# Patient Record
Sex: MALE | Race: WHITE
[De-identification: names, ages, dates, MRNs, and addresses within clinical notes are randomized per-mention and may not be internally consistent; named-entity substitution may affect disease eponyms.]

---

## 2017-10-20 ENCOUNTER — HOSPITAL ENCOUNTER (INPATIENT)
Dept: HOSPITAL 74 - FER | Age: 62
LOS: 4 days | Discharge: HOME | DRG: 378 | End: 2017-10-24
Attending: NURSE PRACTITIONER | Admitting: INTERNAL MEDICINE
Payer: COMMERCIAL

## 2017-10-20 VITALS — BODY MASS INDEX: 28.7 KG/M2

## 2017-10-20 DIAGNOSIS — I10: ICD-10-CM

## 2017-10-20 DIAGNOSIS — E78.5: ICD-10-CM

## 2017-10-20 DIAGNOSIS — I25.2: ICD-10-CM

## 2017-10-20 DIAGNOSIS — K21.9: ICD-10-CM

## 2017-10-20 DIAGNOSIS — Z79.1: ICD-10-CM

## 2017-10-20 DIAGNOSIS — Z87.11: ICD-10-CM

## 2017-10-20 DIAGNOSIS — I25.110: ICD-10-CM

## 2017-10-20 DIAGNOSIS — K20.9: ICD-10-CM

## 2017-10-20 DIAGNOSIS — Z87.891: ICD-10-CM

## 2017-10-20 DIAGNOSIS — E83.42: ICD-10-CM

## 2017-10-20 DIAGNOSIS — Z95.5: ICD-10-CM

## 2017-10-20 DIAGNOSIS — D62: ICD-10-CM

## 2017-10-20 DIAGNOSIS — K26.4: Primary | ICD-10-CM

## 2017-10-20 LAB
ALBUMIN SERPL-MCNC: 3.7 G/DL (ref 3.5–5)
ALP SERPL-CCNC: 47 U/L (ref 32–92)
ALT SERPL-CCNC: 31 U/L (ref 10–40)
ANION GAP SERPL CALC-SCNC: 6 MMOL/L (ref 8–16)
APTT BLD: 25 SECONDS (ref 24–38.9)
AST SERPL-CCNC: 26 U/L (ref 10–42)
BASOPHILS # BLD: 0.1 % (ref 0–2)
BILIRUB SERPL-MCNC: 1 MG/DL (ref 0.2–1)
CALCIUM SERPL-MCNC: 10.4 MG/DL (ref 8.4–10.2)
CK SERPL-CCNC: 182 IU/L (ref 39–308)
CO2 SERPL-SCNC: 27 MMOL/L (ref 22–28)
COLOR UR: YELLOW
CREAT SERPL-MCNC: 0.8 MG/DL (ref 0.6–1.3)
DEPRECATED RDW RBC AUTO: 12.1 % (ref 11.9–15.9)
DEPRECATED RDW RBC AUTO: 12.2 % (ref 11.9–15.9)
DEPRECATED RDW RBC AUTO: 13.1 % (ref 11.9–15.9)
EOSINOPHIL # BLD: 3.7 % (ref 0–4.5)
GLUCOSE SERPL-MCNC: 117 MG/DL (ref 74–106)
HEMOCCULT SP1 STL QL IA: POSITIVE
INR BLD: 1.06 (ref 0.82–1.09)
MCH RBC QN AUTO: 33.3 PG (ref 25.7–33.7)
MCH RBC QN AUTO: 33.5 PG (ref 25.7–33.7)
MCH RBC QN AUTO: 33.6 PG (ref 25.7–33.7)
MCHC RBC AUTO-ENTMCNC: 34.2 G/DL (ref 32–35.9)
MCHC RBC AUTO-ENTMCNC: 34.4 G/DL (ref 32–35.9)
MCHC RBC AUTO-ENTMCNC: 34.6 G/DL (ref 32–35.9)
MCV RBC: 97.1 FL (ref 80–96)
MCV RBC: 97.2 FL (ref 80–96)
MCV RBC: 97.4 FL (ref 80–96)
NEUTROPHILS # BLD: 62.4 % (ref 42.8–82.8)
PH UR: 7 [PH] (ref 4.5–8)
PLATELET # BLD AUTO: 178 K/MM3 (ref 134–434)
PLATELET # BLD AUTO: 196 K/MM3 (ref 134–434)
PLATELET # BLD AUTO: 215 K/MM3 (ref 134–434)
PMV BLD: 7.1 FL (ref 7.5–11.1)
PMV BLD: 7.3 FL (ref 7.5–11.1)
PMV BLD: 7.8 FL (ref 7.5–11.1)
PROT SERPL-MCNC: 5.7 G/DL (ref 6.4–8.3)
PT PNL PPP: 11.8 SEC (ref 10.2–13)
SP GR UR: 1.02 (ref 1–1.02)
TROPONIN I SERPL-MCNC: < 0.03 NG/ML (ref 0.03–0.5)
UROBILINOGEN UR STRIP-MCNC: 0.2 MG/DL (ref 0.2–1)
WBC # BLD AUTO: 11.2 K/MM3 (ref 4–10)
WBC # BLD AUTO: 11.6 K/MM3 (ref 4–10.8)
WBC # BLD AUTO: 8.6 K/MM3 (ref 4–10.8)

## 2017-10-20 PROCEDURE — 0DD68ZX EXTRACTION OF STOMACH, VIA NATURAL OR ARTIFICIAL OPENING ENDOSCOPIC, DIAGNOSTIC: ICD-10-PCS | Performed by: INTERNAL MEDICINE

## 2017-10-20 PROCEDURE — P9058 RBC, L/R, CMV-NEG, IRRAD: HCPCS

## 2017-10-20 PROCEDURE — 0DD58ZX EXTRACTION OF ESOPHAGUS, VIA NATURAL OR ARTIFICIAL OPENING ENDOSCOPIC, DIAGNOSTIC: ICD-10-PCS | Performed by: INTERNAL MEDICINE

## 2017-10-20 PROCEDURE — 0W3P8ZZ CONTROL BLEEDING IN GASTROINTESTINAL TRACT, VIA NATURAL OR ARTIFICIAL OPENING ENDOSCOPIC: ICD-10-PCS | Performed by: INTERNAL MEDICINE

## 2017-10-20 PROCEDURE — 3E0G8GC INTRODUCTION OF OTHER THERAPEUTIC SUBSTANCE INTO UPPER GI, VIA NATURAL OR ARTIFICIAL OPENING ENDOSCOPIC: ICD-10-PCS | Performed by: INTERNAL MEDICINE

## 2017-10-20 PROCEDURE — P9038 RBC IRRADIATED: HCPCS

## 2017-10-20 RX ADMIN — PANTOPRAZOLE SODIUM SCH MG: 40 INJECTION, POWDER, FOR SOLUTION INTRAVENOUS at 21:50

## 2017-10-20 RX ADMIN — CHLORHEXIDINE GLUCONATE SCH APPLIC: 213 SOLUTION TOPICAL at 21:50

## 2017-10-20 RX ADMIN — SODIUM CHLORIDE SCH MLS/HR: 9 INJECTION, SOLUTION INTRAVENOUS at 17:19

## 2017-10-20 RX ADMIN — MUPIROCIN SCH APPLIC: 20 OINTMENT TOPICAL at 21:50

## 2017-10-20 NOTE — PDOC
History of Present Illness





- General


Chief Complaint: Shortness of Breath


Stated Complaint: dizziness,sob,diaphoretic,ruq pain


Time Seen by Provider: 10/20/17 08:21





- History of Present Illness


Initial Comments: 





10/20/17 09:42


62 M with h/o HTN, HLD, CAD/MI s/p stent in 2012 on plavix, H pylori, 

presenting to ER with lightheadedness and weakness since last night. Pt states 

that he is typically an active person who plays tennis regularly. However, last 

night he began to feel extremely weak. He reports getting short of breath with 

minimal activity, feeling fatigued just walking to the TV. Pt denies CP. Denies 

any SOB at rest. Denies leg swelling. Denies F/C.


Pt also endorses dark stools x 3 days. He is currently on an herbal colon 

cleanse, which he reports has given him discolored stools in the past. He has 

not had colonoscopy or EGD in recent years. Has not been treated for H pylori 

recently.





Past History





- Past Medical History


Allergies/Adverse Reactions: 


 Allergies











Allergy/AdvReac Type Severity Reaction Status Date / Time


 


iodine [Iodine] Allergy  Rash Verified 10/20/17 08:23











Home Medications: 


Ambulatory Orders





Atorvastatin Ca [Lipitor (Restricted To Cardiology)] 80 mg PO HS 04/22/12 


Lisinopril [Prinivil] 10 mg PO DAILY 06/01/12 


Aspirin [ASA] 125 mg PO DAILY 07/01/12 


Nebivolol [Bystolic] 10 mg PO HS 07/01/12 


Antiox #11/Om3/Dha/Epa/Lut/Yun [Eye Health Adult 50+ Softgel] 1 each PO 10/20/

17 


Brain Boost  10/20/17 


Clopidogrel Bisulfate [Plavix -] 75 mg PO DAILY 10/20/17 


Herbal Drugs [Colon Herbal Cleanser] 1 each PO 10/20/17 


Liver Cleans  10/20/17 








Cardiac Disorders: Yes (MI X 4 YEARS AGO)


HTN: Yes


Hypercholesterolemia: Yes


Thyroid Disease: Yes





- Surgical History


Cardiac Surgery: Yes (CATH,STENT X 1)





- Suicide/Smoking/Psychosocial Hx


Smoking Status: No


Smoking History: Former smoker


Years of Tobacco Use: 6


Have you smoked in the past 12 months: No


Number of Cigarettes Smoked Daily: 20


If you are a former smoker, when did you quit?: at the age  of 18


Information on smoking cessation initiated: No


Hx Alcohol Use: Yes (daily 5 beers and wine and beer on weekends)


Drug/Substance Use Hx: No


Substance Use Type: None


Hx Substance Use Treatment: No





**Review of Systems





- Review of Systems


Comments:: 





10/20/17 09:45


"GENERAL/CONSTITUTIONAL: +weakness, No fever or chills.


HEAD, EYES, EARS, NOSE AND THROAT: No change in vision. No ear pain or 

discharge. No sore throat.


CARDIOVASCULAR: No chest pain or shortness of breath.


RESPIRATORY: +dyspnea on exertion, No cough, wheezing, or hemoptysis.


GASTROINTESTINAL: No nausea, vomiting, diarrhea or constipation.


GENITOURINARY: No dysuria, frequency, or change in urination.


MUSCULOSKELETAL: No joint or muscle swelling or pain. No neck or back pain.


SKIN: No rash


NEUROLOGIC: No headache, vertigo, loss of consciousness, or change in strength/

sensation.


ENDOCRINE: No increased thirst. No abnormal weight change.


HEMATOLOGIC/LYMPHATIC: No anemia, easy bleeding, or history of blood clots.


ALLERGIC/IMMUNOLOGIC: No hives or skin allergy.


"





*Physical Exam





- Vital Signs


 Last Vital Signs











Temp Pulse Resp BP Pulse Ox


 


 97.9 F   71   16   159/91   100 


 


 10/20/17 08:21  10/20/17 09:27  10/20/17 09:27  10/20/17 09:27  10/20/17 09:27














- Physical Exam


Comments: 





10/20/17 09:46


"GENERAL: Awake, alert, and fully oriented, in no acute distress


HEAD: No signs of trauma


EYES: PERRLA, EOMI, sclera anicteric, conjunctiva clear


ENT: Auricles normal inspection, hearing grossly normal, nares patent, 

oropharynx clear without exudates. Moist mucosa


NECK: Nontender, no stepoffs, Normal ROM, supple, no lymphadenopathy, JVD, or 

masses


LUNGS: Breath sounds equal, clear to auscultation bilaterally.  No wheezes, and 

no crackles


HEART: Regular rate and rhythm, normal S1 and S2, no murmurs, rubs or gallops


ABDOMEN: Soft, nontender, normoactive bowel sounds.  No guarding, no rebound.  

No masses


EXTREMITIES: Normal range of motion, no edema.  No clubbing or cyanosis. No 

cords, erythema, or tenderness


NEUROLOGICAL: Cranial nerves II through XII intact. 5/5 strength and sensation 

in all extremities, Normal speech, normal gait


SKIN: Warm, Dry, normal turgor, no rashes or lesions noted.


RECTAL: dark stools, no hematochezia


"





**Heart Score/ECG Review





- History


History: Moderately suspicious





- Electrocardiogram


EKG: Non specific repolarization disturbance





- Age


Age: 45-65





- Risk Factors


Risk Factors Heart Score: Yes Hx Hypercholesterolemia, Yes Hx Hypertension


Based on the list above the patient has:: >/=3 risk factors or Hx 

atherosclerotic disease





- ECG Impressions


Comment:: 





10/20/17 09:47


NSR, no ALINA/STDs, TWI in I and AVL present, not seen on prior EKG in 2012. Left 

axis deviation, QRS widening





ED Treatment Course





- LABORATORY


CBC & Chemistry Diagram: 


 10/21/17 15:00





 10/21/17 05:45





- ADDITIONAL ORDERS


Additional order review: 


 











  10/20/17





  09:00


 


RBC  3.23 L D


 


MCV  97.2 H


 


MCHC  34.2


 


RDW  12.1


 


MPV  7.8  D


 


Neutrophils %  62.4


 


Lymphocytes %  24.3


 


Monocytes %  9.5


 


Eosinophils %  3.7


 


Basophils %  0.1














- RADIOLOGY


Radiology Studies Ordered: 














 Category Date Time Status


 


 CHEST PA & LAT [RAD] Stat Radiology  10/20/17 09:09 Ordered














Medical Decision Making





- Medical Decision Making


10/20/17 09:47


62 M with weakness, fatigue, and dyspnea on exertion since last night. Pt with 

no chest pain, but must r/o MI, as pt has h/o prior MI and new lateral TWI on 

today's EKG. Also consider symptomatic anemia 2/2 GI bleed given h/o dark 

stools on plavix. Pt with no PE risk factors and is anticoagulated. No tachypnea

, tachycardia, or hypoxia in ER, so PE is unlikely. Will evaluate for 

infectious process with CXR and UA.


- Labs, trop, BNP


- Stool guaiac


- CXR


- Admit to tele





10/20/17 11:01


Pt with guaiac + dark stool.


Elevated BUN to Cr ratio suggestive of likely upper GI source.


Trop negative.





PPI started, will admit for UGIB.


Dr. Marshall, GI on call, to evaluate pt for EGD.





Admitted to hospitalist.


Case discussed in detail with admitting physician including history, physical 

exam and ancillary studies.


Admitting physician has assumed care for the patient and will follow all 

pending diagnostics and complete the evaluation and treatment.  





*DC/Admit/Observation/Transfer


Diagnosis at time of Disposition: 


 Upper GI bleed





- Discharge Dispostion


Admit: Yes





- Attestations


Physician Attestion: 





10/20/17 11:02








I, Dr. Wallace Keyes MD, attest that this document has been prepared under my 

direction and personally reviewed by me in its entirety.   I further attest, 

that it accurately reflects all work, treatment, procedures and medical decision

-making performed by me.

## 2017-10-20 NOTE — CON.GI
Consult


Consult Specialty:: GI


Referred by:: Dr Keyes


Reason for Consultation:: upper GI bleeding, acute blood loss anemia





- History of Present Illness


History of Present Illness: 


A 61 yo male who came to ED with c/o fatigue and lightheadedness on standing 

up. No chest pain, nausea, vomiting, palpations, diaphoresis, or numbness. On 

questioning reports 3 days of dark, tarry stools. Deneis mil hematocheziam 

hematemesis, diarrhea, dysphasia, odynophagia. Reports history of H. pylori 

infection about 20 y. ago. Takes daily NSAIDs for pains and aches, and tums at 

night for "acid reflux". No significant weight loss. No history of colonoscopy.





- History Source


History Provided By: Patient





- Past Medical History


Cardio/Vascular: Yes: MI


Gastrointestinal: Yes: Other (h. pylori infection 20 y ago)


Hepatobiliary: No: Cirrhosis, Cholelithiasis, Hepatitis B, Hepatitis C





- Past Surgical History


Additional Surgical History: EGD 20 y ago





- Alcohol/Substance Use


Hx Alcohol Use: Yes (daily 5 beers and wine and beer on weekends)





- Smoking History


Smoking history: Former smoker


Have you smoked in the past 12 months: No


Aproximately how many cigarettes per day: 20


If you are a former smoker, when did you quit?: at the age  of 18





Home Medications





- Allergies


Allergies/Adverse Reactions: 


 Allergies











Allergy/AdvReac Type Severity Reaction Status Date / Time


 


iodine [Iodine] Allergy  Rash Verified 10/20/17 08:23














- Home Medications


Home Medications: 


Ambulatory Orders





Atorvastatin Ca [Lipitor (Restricted To Cardiology)] 80 mg PO HS 04/22/12 


Lisinopril [Prinivil] 10 mg PO DAILY 06/01/12 


Aspirin [ASA] 125 mg PO DAILY 07/01/12 


Nebivolol [Bystolic] 10 mg PO HS 07/01/12 


Antiox #11/Om3/Dha/Epa/Lut/Yun [Eye Health Adult 50+ Softgel] 1 each PO 10/20/

17 


Brain Boost  10/20/17 


Clopidogrel Bisulfate [Plavix -] 75 mg PO DAILY 10/20/17 


Herbal Drugs [Colon Herbal Cleanser] 1 each PO 10/20/17 


Liver Cleans  10/20/17 











Family Disease History





- Family Disease History


Family History: Unremarkable





Review of Systems





- Review of Systems


Constitutional: reports: Malaise, Weakness.  denies: Chills, Diaphoresis, Fever

, Lethargy, Loss of Appetite, Night Sweats, Unintentional Wgt. Loss


Eyes: reports: No Symptoms


HENT: reports: No Symptoms


Neck: reports: No Symptoms


Respiratory: denies: Cough, SOB, SOB on Exertion, Wheezing


Gastrointestinal: reports: Indigestion, Melena.  denies: Abdominal Pain, 

Bloating, Constipation, Diarrhea, Dysphagia, Nausea, Rectal Bleeding, Vomiting, 

Vomiting Blood


Musculoskeletal: reports: Joint Pain (takes NSAIDs)


Neurological: denies: Syncope, Unsteady Gait


Hematology/Lymphatic: denies: Easily Bruised, Excessive Bleeding





Physical Exam-GI


Vital Signs: 


 Vital Signs











Temperature  97.9 F   10/20/17 08:21


 


Pulse Rate  62   10/20/17 10:37


 


Respiratory Rate  16   10/20/17 10:37


 


Blood Pressure  120/80   10/20/17 10:37


 


O2 Sat by Pulse Oximetry (%)  100   10/20/17 10:37











Constitutional: Yes: Well Nourished, No Distress, Calm


Eyes: Yes: Conjunctiva Clear


HENT: Yes: Atraumatic


Neck: Yes: Supple


Cardiovascular: Yes: Regular Rate and Rhythm


Respiratory: Yes: Regular


Gastrointestinal Inspection: No: Distention


...Auscultate: Yes: Normoactive Bowel Sounds


...Palpate: Yes: Soft.  No: Firm/Rigid, Guarding, Mass, Pulsatile Mass, 

Splenomegaly, Tenderness


Musculoskeletal: Yes: WNL


Extremities: Yes: WNL


Neurological: Yes: Alert, Oriented


Labs: 


 CBC, BMP





 10/20/17 09:00 





 10/20/17 09:00 





 INR, PTT











INR  1.06  (0.82-1.09)   10/20/17  09:00    








 Laboratory Tests











  10/20/17 10/20/17 10/20/17





  09:00 09:00 09:00


 


WBC  8.6  


 


RBC  3.23 L D  


 


Hgb  10.7 L D  


 


Hct  31.4 L D  


 


MCV  97.2 H  


 


MCH  33.3  


 


MCHC  34.2  


 


RDW  12.1  


 


Plt Count  215  


 


MPV  7.8  D  


 


Neutrophils %  62.4  


 


Lymphocytes %  24.3  


 


Monocytes %  9.5  


 


Eosinophils %  3.7  


 


Basophils %  0.1  


 


PT with INR    11.8


 


INR    1.06


 


PTT (Actin FS)    25.0 L


 


Sodium   134 L 


 


Potassium   4.2 


 


Chloride   101 


 


Carbon Dioxide   27 


 


Anion Gap   6 L 


 


BUN   47 H D 


 


Creatinine   0.8 


 


Creat Clearance w eGFR   > 60 


 


Random Glucose   117 H 


 


Calcium   10.4 H 


 


Total Bilirubin   1.0  D 


 


AST   26  D 


 


ALT   31  D 


 


Alkaline Phosphatase   47 


 


Creatine Kinase   182 


 


Creatine Kinase Index   2.6 


 


CK-MB (CK-2)   4.9 H 


 


Troponin I   < 0.03 L 


 


Total Protein   5.7 L 


 


Albumin   3.7 


 


Lipase   


 


Urine Color   


 


Urine Appearance   


 


Urine pH   


 


Ur Specific Gravity   


 


Urine Protein   


 


Urine Glucose (UA)   


 


Urine Ketones   


 


Urine Blood   


 


Urine Nitrite   


 


Urine Bilirubin   


 


Urine Urobilinogen   


 


Ur Leukocyte Esterase   


 


Stool Occult Blood   














  10/20/17 10/20/17





  09:00 09:25


 


WBC  


 


RBC  


 


Hgb  


 


Hct  


 


MCV  


 


MCH  


 


MCHC  


 


RDW  


 


Plt Count  


 


MPV  


 


Neutrophils %  


 


Lymphocytes %  


 


Monocytes %  


 


Eosinophils %  


 


Basophils %  


 


PT with INR  


 


INR  


 


PTT (Actin FS)  


 


Sodium  


 


Potassium  


 


Chloride  


 


Carbon Dioxide  


 


Anion Gap  


 


BUN  


 


Creatinine  


 


Creat Clearance w eGFR  


 


Random Glucose  


 


Calcium  


 


Total Bilirubin  


 


AST  


 


ALT  


 


Alkaline Phosphatase  


 


Creatine Kinase  


 


Creatine Kinase Index  


 


CK-MB (CK-2)  


 


Troponin I  


 


Total Protein  


 


Albumin  


 


Lipase  24 


 


Urine Color   Yellow


 


Urine Appearance   Clear


 


Urine pH   7.0


 


Ur Specific Gravity   1.020


 


Urine Protein   Negative


 


Urine Glucose (UA)   Negative


 


Urine Ketones   Negative


 


Urine Blood   Negative


 


Urine Nitrite   Negative


 


Urine Bilirubin   Negative


 


Urine Urobilinogen   0.2


 


Ur Leukocyte Esterase   Negative


 


Stool Occult Blood   Positive














Problem List





- Problems


(1) Gastrointestinal hemorrhage with melena


Code(s): K92.1 - MELENA





(2) NSAID long-term use


Code(s): Z79.1 - LONG TERM (CURRENT) USE OF NON-STEROIDAL NON-INFLAM (NSAID)





(3) Anemia associated with acute blood loss


Code(s): D62 - ACUTE POSTHEMORRHAGIC ANEMIA





(4) Duodenal hemorrhage


Code(s): K92.2 - GASTROINTESTINAL HEMORRHAGE, UNSPECIFIED





(5) Esophagitis determined by endoscopy


Code(s): K20.9 - ESOPHAGITIS, UNSPECIFIED








Assessment/Plan


s/p EGD with control of bleeding


2 5 mm shallow, bulb/1st portion actively oozing from visible vessel ulcers 

found and injected with a total of 12 cc 1:10,000 epi. Complete hemostasis was 

achieved. Resolution clips, despite multiple attempts, could not be deployed 

due to ulcers specifically difficult location.





Admit to ICU for, at least, 24 hours observation. 


Continue PPI IV and keep NPO today


CBC  today and in AM


Stop all NSAIDs


Discussed with the patent and his wife

## 2017-10-20 NOTE — HP
CHIEF COMPLAINT:


MOULTON,dizziness and weakness


PCP:





 GI Dr. Castillo 


cardiology Dr. Rivas


HISTORY OF PRESENT ILLNESS:


 presenting to ER with lightheadedness and weakness since last night. Pt states 

that he is typically an active person who plays tennis regularly. However, last 

night he began to feel extremely weak. He reports getting short of breath with 

minimal activity, feeling fatigued just walking to the TV. Pt denies CP. Denies 

any SOB at rest. Denies leg swelling. Denies F/


Pt also endorses dark stools x 3 days. He is currently on an herbal colon 

cleanse, which he reports has given him discolored stools in the past. He has 

not had colonoscopy or EGD in recent years. Has not been treated for H pylori 

recently.


This is a 62 M with h/o HTN, HLD, CAD/MI s/p stent in 2012 on Plavix,hx of H 

pylori, GERD, who presents to ER  complains of  worsening lightheadedness,

weakness, MOULTON with minimal activity,palpitations,diaphoresis,mild RUQ pain and 

chronic GERD symptoms,denies cp, N/V/D,urinary symptoms,urinary symptoms or 

rectal bleed. Pt also reports of having dark stool for the last 3 days,

currently on an herbal colon cleanse, which he reports has discolored stools in 

the past. Denies recent EGD/ colonoscopy.





ER course was notable for:


(1)CxR: No acute pathology 


(2)EKG: SR  with T wave inversion


(3) Stool OB - positive 





Recent Travel: No 





PAST MEDICAL HISTORY:


 See above 





PAST SURGICAL HISTORY:


deviated septal sx > 10 yrs ago


Social History:


Smoking:None 


Alcohol: Yes- 3 cans of beer daily and half a bottle of wine every other day


Drugs:  None





Family History:


 Father : Bleeding stomack ulcer, HTN, Smoker 


 Mother : HTN, DM, HDL, and cataract 


 Brother -  Had MI with stent 


 Maternal grandfather - MI





Allergies





iodine [Iodine] Allergy (Verified 10/20/17 08:23)


 Rash








HOME MEDICATIONS:


 Home Medications











 Medication  Instructions  Recorded


 


Atorvastatin Ca [Lipitor 80 mg PO HS 04/22/12





(Restricted To Cardiology)]  


 


Lisinopril [Prinivil] 10 mg PO DAILY 06/01/12


 


Aspirin [ASA] 125 mg PO DAILY 07/01/12


 


Nebivolol [Bystolic] 10 mg PO HS 07/01/12


 


Antiox #11/Om3/Dha/Epa/Lut/Yun 1 each PO 10/20/17





[Eye Health Adult 50+ Softgel]  


 


Brain Boost  10/20/17


 


Clopidogrel Bisulfate [Plavix -] 75 mg PO DAILY 10/20/17


 


Herbal Drugs [Colon Herbal 1 each PO 10/20/17





Cleanser]  


 


Liver Cleans  10/20/17








REVIEW OF SYSTEMS


CONSTITUTIONAL:  C/o generalized weakness 


Absent:  fever, chills, diaphoresis, malaise, loss of appetite, weight change


HEENT: 


Absent:  rhinorrhea, nasal congestion, throat pain, throat swelling, difficulty 

swallowing, mouth swelling, ear pain, eye pain, visual changes


CARDIOVASCULAR: 


Absent: chest pain, syncope, palpitations, irregular heart rate, lightheadedness

, peripheral edema


RESPIRATORY: + dyspnea with exertion


Absent: cough, shortness of breath, , orthopnea, wheezing, stridor, hemoptysis


GASTROINTESTINAL:


Absent: abdominal pain, abdominal distension, nausea, vomiting, diarrhea, 

constipation, melena, hematochezia


GENITOURINARY: 


Absent: dysuria, frequency, urgency, hesitancy, hematuria, flank pain, genital 

pain


MUSCULOSKELETAL: 


Absent: myalgia, arthralgia, joint swelling, back pain, neck pain


SKIN: 


Absent: rash, itching, pallor


HEMATOLOGIC/IMMUNOLOGIC: 


Absent: easy bleeding, easy bruising, lymphadenopathy, frequent infections


ENDOCRINE:


Absent: unexplained weight gain, unexplained weight loss, heat intolerance, 

cold intolerance


NEUROLOGIC: 


Absent: headache, focal weakness or paresthesias, dizziness, unsteady gait, 

seizure, mental status changes, bladder or bowel incontinence


PSYCHIATRIC: 


Absent: anxiety, depression, suicidal or homicidal ideation, hallucinations.








PHYSICAL EXAMINATION


 Vital Signs - 24 hr











  10/20/17 10/20/17 10/20/17





  08:21 09:27 10:37


 


Temperature 97.9 F  


 


Pulse Rate 58 L  


 


Pulse Rate [  71 62





Apical]   


 


Respiratory 15 16 16





Rate   


 


Blood Pressure 138/85  


 


Blood Pressure  159/91 120/80





[Arm]   


 


O2 Sat by Pulse 100 100 100





Oximetry (%)   











GENERAL: Awake, alert, and fully oriented, in no acute distress.


HEAD: Normal with no signs of trauma.


EYES: Pupils equal, round and reactive to light, extraocular movements intact, 

sclera anicteric, conjunctiva clear. No lid lag.


EARS, NOSE, THROAT: Ears normal, nares patent, oropharynx clear without 

exudates. Moist mucous membranes.


NECK: Normal range of motion, supple without lymphadenopathy, JVD, or masses.


LUNGS: Breath sounds equal, clear to auscultation bilaterally. No wheezes, and 

no crackles. No accessory muscle use.


HEART: Regular rate and rhythm, normal S1 and S2 without murmur, rub or gallop.


ABDOMEN: Soft, nontender, not distended, normoactive bowel sounds, no guarding, 

no rebound, no masses.  No hepatomegaly or  splenomegaly. 


MUSCULOSKELETAL: Normal range of motion at all joints. No bony deformities or 

tenderness. No CVA tenderness.


UPPER EXTREMITIES: 2+ pulses, warm, well-perfused. No cyanosis. No clubbing. No 

peripheral edema.


LOWER EXTREMITIES: 2+ pulses, warm, well-perfused. No calf tenderness. No 

peripheral edema. 


NEUROLOGICAL:  Cranial nerves II-XII intact. Normal speech. Normal gait.


PSYCHIATRIC: Cooperative. Good eye contact. Appropriate mood and affect.


SKIN: Warm, dry, normal turgor, no rashes or lesions noted, normal capillary 

refill. 


 Laboratory Results - last 24 hr











  10/20/17 10/20/17 10/20/17





  09:00 09:00 09:00


 


WBC  8.6  


 


RBC  3.23 L D  


 


Hgb  10.7 L D  


 


Hct  31.4 L D  


 


MCV  97.2 H  


 


MCH  33.3  


 


MCHC  34.2  


 


RDW  12.1  


 


Plt Count  215  


 


MPV  7.8  D  


 


Neutrophils %  62.4  


 


Lymphocytes %  24.3  


 


Monocytes %  9.5  


 


Eosinophils %  3.7  


 


Basophils %  0.1  


 


PT with INR    11.8


 


INR    1.06


 


PTT (Actin FS)    25.0 L


 


Sodium   134 L 


 


Potassium   4.2 


 


Chloride   101 


 


Carbon Dioxide   27 


 


Anion Gap   6 L 


 


BUN   47 H D 


 


Creatinine   0.8 


 


Creat Clearance w eGFR   > 60 


 


Random Glucose   117 H 


 


Calcium   10.4 H 


 


Total Bilirubin   1.0  D 


 


AST   26  D 


 


ALT   31  D 


 


Alkaline Phosphatase   47 


 


Creatine Kinase   182 


 


Creatine Kinase Index   2.6 


 


CK-MB (CK-2)   4.9 H 


 


Troponin I   < 0.03 L 


 


Total Protein   5.7 L 


 


Albumin   3.7 


 


Lipase   


 


Urine Color   


 


Urine Appearance   


 


Urine pH   


 


Ur Specific Gravity   


 


Urine Protein   


 


Urine Glucose (UA)   


 


Urine Ketones   


 


Urine Blood   


 


Urine Nitrite   


 


Urine Bilirubin   


 


Urine Urobilinogen   


 


Ur Leukocyte Esterase   


 


Stool Occult Blood   














  10/20/17 10/20/17





  09:00 09:25


 


WBC  


 


RBC  


 


Hgb  


 


Hct  


 


MCV  


 


MCH  


 


MCHC  


 


RDW  


 


Plt Count  


 


MPV  


 


Neutrophils %  


 


Lymphocytes %  


 


Monocytes %  


 


Eosinophils %  


 


Basophils %  


 


PT with INR  


 


INR  


 


PTT (Actin FS)  


 


Sodium  


 


Potassium  


 


Chloride  


 


Carbon Dioxide  


 


Anion Gap  


 


BUN  


 


Creatinine  


 


Creat Clearance w eGFR  


 


Random Glucose  


 


Calcium  


 


Total Bilirubin  


 


AST  


 


ALT  


 


Alkaline Phosphatase  


 


Creatine Kinase  


 


Creatine Kinase Index  


 


CK-MB (CK-2)  


 


Troponin I  


 


Total Protein  


 


Albumin  


 


Lipase  24 


 


Urine Color   Yellow


 


Urine Appearance   Clear


 


Urine pH   7.0


 


Ur Specific Gravity   1.020


 


Urine Protein   Negative


 


Urine Glucose (UA)   Negative


 


Urine Ketones   Negative


 


Urine Blood   Negative


 


Urine Nitrite   Negative


 


Urine Bilirubin   Negative


 


Urine Urobilinogen   0.2


 


Ur Leukocyte Esterase   Negative


 


Stool Occult Blood   Positive











ASSESSMENT/PLAN:


 


This is a 62 M with h/o HTN, HLD, CAD/MI s/p stent in 2012 on Plavix,hx of  H 

pylori and GERD,who presents to ER c/o worsening weakness, dizziness,MOULTON,

palpitations,diaphoresis,mild RUQ pain and chronic GERD symptoms,denies cp, N/V/

D,urinary symptoms or urinary symptoms.








* GI bleed


- stool OB positive in ER


- will hold off on ASA, plavix 


-  started on PPI


- GI consulted - for EGD today 


- will keep pt NPO


- CBC stable now - 10.7/31.4 (baseline in 2012 14.9) will f/u on CBC 


- consent obtained for blood transfusion 





* HTN


 will continue on home meds , Lisinopril and Bystolic 


- will monitor BP closely 





* HDL- will resume on Statin once PO rafy well





* CAD, s/p stent in 2012


-will cont on BB, Statin 


- will hold off on ASA, Plavix in view of GI bleed


- telemetry monitoring 


- EKG- SR - T wave inversion - asymptomatic 


- will check serial cardiac enzymes 


- out pt cardiology  Dr. Rivas





F/E/N:  IV hydration


 VTE prophylaxis - SCD in view of bleeding.











Visit type





- Emergency Visit


Emergency Visit: Yes


Care time: The patient presented to the Emergency Department on the above date 

and was hospitalized for further evaluation of their emergent condition.





- New Patient


This patient is new to me today: Yes


Date on this admission: 10/20/17





- Critical Care


Critical Care patient: No

## 2017-10-20 NOTE — CONSULT
Consult


Consult Specialty:: Pulm/CCM


Reason for Consultation:: GIB s/p endoscopyand clipping of bleeding gastric 

ulcers





- History of Present Illness


Chief Complaint: Weakness


History of Present Illness: 


 62yom with a PMHx of HTN, HLD, CAD s/p MI w/ stents on Plavix, GERD, H.Pylori 

who presented  to ED with lightheadedness and weakness x1d, decreased exercise 

tolerance with SOB, weakness , diaphoresis and palpitations. Also with c/o dark 

stools x3 days. Reports daily use of NSAIDs Advil, Aleve and Excedrin and 

caffeine to support him doing his job as a . On EGD in the OR he was 

found to have two 5mm shallow bleeding gastric ulcers in the bulb and first 

portion of the stomach. Complete hemostasis was acheived with epi injection but 

clipping was unsuccessful d/t position of the ulcers. Labs notable for 

uptrending trop and hgb 10.6->8.8. He was transferred to ICU for post procedure 

monitoring. 





In ICU he was A+O x3  HR 78, /70, RR21, O2 sat 100% on 2L NCO2. No BM as 

yet. No c/o chest pain or abd pain. Kept NPO for now. Follow CBC and troponin q4

-8.





- History Source


History Provided By: Patient, Medical Record


Limitations to Obtaining History: No Limitations





- Past Medical History


Cardio/Vascular: Yes: HTN, Hyperlipdemia, MI


Gastrointestinal: Yes: GERD, Other (h. pylori infection 20 y ago)


Hepatobiliary: No: Cirrhosis, Cholelithiasis, Hepatitis B, Hepatitis C





- Past Surgical History


Additional Surgical History: EGD 20 y ago





- Alcohol/Substance Use


Hx Alcohol Use: Yes (daily 5 beers and wine and beer on weekends)





- Smoking History


Smoking history: Former smoker


Have you smoked in the past 12 months: No


Aproximately how many cigarettes per day: 20


If you are a former smoker, when did you quit?: at the age  of 18





- Social History


Usual Living Arrangement: With Spouse


History of Recent Travel: No





Home Medications





- Allergies


Allergies/Adverse Reactions: 


 Allergies











Allergy/AdvReac Type Severity Reaction Status Date / Time


 


iodine [Iodine] Allergy  Rash Verified 10/20/17 08:23














- Home Medications


Home Medications: 


Ambulatory Orders





Atorvastatin Ca [Lipitor (Restricted To Cardiology)] 80 mg PO HS 04/22/12 


Lisinopril [Prinivil] 10 mg PO DAILY 06/01/12 


Aspirin [ASA] 125 mg PO DAILY 07/01/12 


Nebivolol [Bystolic] 10 mg PO HS 07/01/12 


Antiox #11/Om3/Dha/Epa/Lut/Yun [[facility] Health Adult 50+ Softgel] 1 each PO 10/20/

17 


Brain Boost  10/20/17 


Clopidogrel Bisulfate [Plavix -] 75 mg PO DAILY 10/20/17 


Herbal Drugs [Colon Herbal Cleanser] 1 each PO 10/20/17 


Liver Cleans  10/20/17 











Family Disease History





- Family Disease History


Family Disease History: Other: Father (Gastric ulcers)





Review of Systems





- Review of Systems


Constitutional: reports: Lethargy, Weakness


Eyes: reports: No Symptoms


HENT: reports: No Symptoms


Neck: reports: No Symptoms


Cardiovascular: reports: Shortness of Breath


Respiratory: reports: No Symptoms


Gastrointestinal: reports: Abdominal Pain, Indigestion


Genitourinary: reports: No Symptoms


Neurological: reports: No Symptoms


Hematology/Lymphatic: reports: No Symptoms


Psychiatric: reports: No Symptoms


Pain Intensity: 0





Physical Exam


Vital Signs: 


 Vital Signs











Temperature  99 F   10/20/17 17:21


 


Pulse Rate  72   10/20/17 19:00


 


Respiratory Rate  18   10/20/17 19:00


 


Blood Pressure  118/60   10/20/17 19:00


 


O2 Sat by Pulse Oximetry (%)  100   10/20/17 16:40











Constitutional: Yes: Well Nourished, No Distress, Calm


Eyes: Yes: WNL


HENT: Yes: Normocephalic


Neck: Yes: WNL, Supple


Cardiovascular: Yes: Regular Rate and Rhythm, S1, S2, Other (occasional 

unifocal PVC)


Respiratory: Yes: Regular, CTA Bilaterally


Gastrointestinal: Yes: Normal Bowel Sounds, Soft


Renal/: Yes: WNL


Musculoskeletal: Yes: WNL


Extremities: Yes: WNL


Edema: No


Peripheral Pulses WNL: Yes


Integumentary: Yes: WNL


Labs: 


 CBC, BMP





 10/20/17 16:30 











Problem List





- Problems


(1) Anemia associated with acute blood loss


Code(s): D62 - ACUTE POSTHEMORRHAGIC ANEMIA





(2) Duodenal hemorrhage


Code(s): K92.2 - GASTROINTESTINAL HEMORRHAGE, UNSPECIFIED





(3) Esophagitis determined by endoscopy


Code(s): K20.9 - ESOPHAGITIS, UNSPECIFIED





(4) Gastrointestinal hemorrhage with melena


Code(s): K92.1 - MELENA





(5) NSAID long-term use


Code(s): Z79.1 - LONG TERM (CURRENT) USE OF NON-STEROIDAL NON-INFLAM (NSAID)





(6) Upper GI bleed


Code(s): K92.2 - GASTROINTESTINAL HEMORRHAGE, UNSPECIFIED








Assessment/Plan


62yom with PMHx HTN, HLD, CAD, MI s/p stents on Plavix, GERD, s/p H Pylori 

treatment admitted with symptomatic anemia in setting of upper GIB. Transferred 

to ICU after EGD and stabilization of bleeding gastric ulcers for observation. 





Plan:


-Monitor HD


-Hgb q6


-Transfuse for Hgb<8


-Monitor troponin


-O2 support with NCO2 for now


-Cardiology consult 


-Cont PPI BID


-NPO for now


-DVT proph with SCD

## 2017-10-20 NOTE — CONSULT
Consultation: 


REQUESTING PROVIDER: Dr. Marshall 





CONSULT REQUEST: We have been asked to medically evaluate this patient for s/p 

EGD for GI bleed.





HISTORY OF PRESENT ILLNESS:


Patient is a 62 year old male with a PMHx of HTN, HLD, CAD s/p MI w/ stents 

five years ago on Plavix, H.Pylori who presented with lightheadedness and 

weakness that started suddenly last night.  Patient then states feeling short 

of breath and fatigued with minimal activity associated with diaphoresis and 

palpitations.  Patients wife reports similar symptoms in the past when he had 

an MI 5 years ago, which prompted this hospital visit.  Patient also reports 

having dark colored stools for the last three days, however, he states having 

dark stools when taking an herbal colon cleanse, which he is currently taking. 

Patient does admit to daily use of NSAIDS for years now due to "feeling sore" 

at the end of the day due to his job, which requires him to move around 

excessively.  Patient states taking 4 Aleve's every other day and Excedrin 

almost daily with daily coffee. Patient states having EGD/Colonoscopy over 10 

years ago.  


Patient was taken to the OR for EGD and was found to have two 5mm shallow 

bleeding ulcers in the bulb and 1st portion but unsuccessful clipping due to 

the locations.  However, complete hemostasis was achieved with epi injection.  

Otherwise, patient denies chest pain, leg pain, hematemesis, loss of 

consciousness, acute vision loss.  Patient admitted to the ICU for 24 hour 

observation.    








REVIEW OF SYSTEMS:


CONSTITUTIONAL: Present: diaphoresis, generalized weakness, malaise


Absent:  fever, chills, loss of appetite, weight change


HEENT: 


Absent:  rhinorrhea, nasal congestion, throat pain, throat swelling, difficulty 

swallowing, mouth swelling, ear pain, eye pain, visual changes


CARDIOVASCULAR: Present: palpitations, lightheadedness 


Absent: chest pain, syncope, irregular heart rate, peripheral edema


RESPIRATORY: Present: shortness of breath, dyspnea with exertion


Absent: cough, orthopnea, wheezing, stridor, hemoptysis


GASTROINTESTINAL: Present: abdominal pain, melena


Absent: abdominal distension, nausea, vomiting, diarrhea, constipation, 

hematochezia


GENITOURINARY: 


Absent: dysuria, frequency, urgency, hesitancy, hematuria, flank pain, genital 

pain


MUSCULOSKELETAL: 


Absent: myalgia, arthralgia, joint swelling, back pain, neck pain


SKIN: 


Absent: rash, itching, pallor


HEMATOLOGIC/IMMUNOLOGIC: 


Absent: easy bleeding, easy bruising, lymphadenopathy, frequent infections


ENDOCRINE:


Absent: unexplained weight gain, unexplained weight loss, heat intolerance, 

cold intolerance


NEUROLOGIC: 


Absent: headache, focal weakness or paresthesias, dizziness, unsteady gait, 

seizure, mental status changes, bladder or bowel incontinence


PSYCHIATRIC: 


Absent: anxiety, depression, suicidal or homicidal ideation, hallucinations.





PHYSICAL EXAMINATION





 Vital Signs - 24 hr











  10/20/17 10/20/17 10/20/17





  13:50 13:55 14:00


 


Temperature 97.5 F L 97.5 F L 97.5 F L


 


Pulse Rate 68 62 61


 


Respiratory 19 19 20





Rate   


 


Blood Pressure 133/31 140/74 127/49


 


O2 Sat by Pulse 100 100 100





Oximetry (%)   














  10/20/17 10/20/17 10/20/17





  14:05 14:15 14:30


 


Temperature 97.5 F L 97.5 F L 97.5 F L


 


Pulse Rate 59 L 58 L 54 L


 


Respiratory 23 21 17





Rate   


 


Blood Pressure 108/47 96/47 90/45


 


O2 Sat by Pulse 100 100 100





Oximetry (%)   














GENERAL: Awake, alert, and fully oriented, in no acute distress.


HEAD: Normal with no signs of trauma.


EYES: Pupils equal, round and reactive to light, extraocular movements intact, 

sclera anicteric, conjunctiva clear. 


EARS, NOSE, THROAT:Oropharynx clear without exudates. Moist mucous membranes.


NECK: Normal range of motion, supple without lymphadenopathy, JVD, or masses.


LUNGS: Breath sounds equal, clear to auscultation bilaterally. No wheezes, and 

no crackles. No accessory muscle use.


HEART: Regular rate and rhythm, normal S1 and S2 without murmur, rub or gallop.


ABDOMEN: Soft, nontender, not distended, normoactive bowel sounds, no guarding, 

no rebound, no masses.  No hepatomegaly or splenomegaly. 


MUSCULOSKELETAL: No bony deformities or tenderness. No CVA tenderness.


UPPER EXTREMITIES: No peripheral edema.


LOWER EXTREMITIES:  No peripheral edema. 


NEUROLOGICAL:  Cranial nerves II-XII intact. Normal speech. Motor strength 5/5 

bilaterally, sensory intact 


PSYCHIATRIC: Cooperative. Good eye contact. Appropriate mood and affect.


SKIN: Warm, dry, normal turgor, no rashes or lesions noted. 











 Laboratory Results - last 24 hr











  10/20/17





  14:55


 


WBC  11.6 H D


 


RBC  2.53 L D


 


Hgb  8.5 L D


 


Hct  24.6 L D


 


MCV  97.4 H


 


MCH  33.5


 


MCHC  34.4


 


RDW  12.2


 


Plt Count  196


 


MPV  7.3 L








Active Medications











Generic Name Dose Route Start Last Admin





  Trade Name Freq  PRN Reason Stop Dose Admin


 


Atorvastatin Calcium  80 mg 10/21/17 22:00  





  Lipitor -  PO   





  HS [facility]   


 


Chlorhexidine Gluconate  1 applic 10/20/17 22:00  





  Hibiclens For Decolonization -  TP   





  HS [facility]   


 


Sodium Chloride  1,000 mls @ 75 mls/hr 10/20/17 12:45  





  Normal Saline -  IV   





  ASDIR [facility]   


 


Lisinopril  10 mg 10/21/17 10:00  





  Prinivil  PO   





  DAILY [facility]   


 


Mupirocin  1 applic 10/20/17 22:00  





  Bactroban Ointment (For Decolonization) -  NS 10/25/17 21:59  





  BID [facility]   


 


Nebivolol  10 mg 10/21/17 10:00  





  Bystolic -  PO   





  DAILY [facility]   


 


Pantoprazole Sodium  40 mg 10/20/17 22:00  





  Protonix Iv  IVPUSH 10/23/17 23:59  





  BID [facility]   








IMAGES:








ASSESSMENT/PLAN:


Patient is a 62 year old male with a PMHx of H.Pylori, GERD, Chronic NSAID use 

and CAD on Plavix who presented for dizziness, weakness, shortness of breath, 

dyspnea on exertion, and dark bloody stools. Patient was taken to the OR for 

EGD and was found to have two duodenal ulcers.  Patient admitted to ICU for 

further monitoring and management. 





Neuro


-AAOx3





Gastroenterology


#Upper GI Bleed Secondary to 2 Bleeding Duodenal Ulcers


-S/P EGD with complete hemostasis achieved


-Continue protonix 40mg BID 


-Continue IV NS @75mls/hr


-Hold ASA and Plavix.  No Anticoagulations


-Avoid all NSAIDS


-Keep NPO overnight and may resume clears in the morning. 


-CBC stable prior to procedure (H&H 10.7/31.4). Repeat CBC and troponins now. 

Then again in the morning 





Cardiology


#CAD s/p MI/stents 5 years ago


-Hold ASA and Plavix due to GI bleed 


-Will continue ACE inhibitor and Beta blocker 


-Serial Troponins ordered


-Continuous cardiac monitoring 





#HTN-controlled


-Continue Lisinopril 10mg daily


-Continue Bystolic 10mg daily


-Continue to monitor BP





#HLD


-Continue Lipitor 80mg daily 





Hematology


#Anemia secondary to acute blood loss 


-Hemoglobin and Hematocrit on initial presentation 10.7/31.4.  Repeat CBC after 

procedure 8.5/24.6.  Second repeat 8.8/25.6 


-Another repeat CBC ordered 


-If hemoglobin <8.0, will transfuse PRBC


-Continue to monitor for any GI bleed and serial CBC's.  





Pulmonology


-No history of COPD/Emphysema 


-02 PRN


-Maintain 02 >95% 





F/E/N


-IV NS @75mls/hr


-Electrolytes wnl


-NPO overnight and clear liquids in the morning 





Prophylaxis


-Low risk due to GI bleed.  SCD's for DVT.  Hold all AC 


-Protonix 40mg IVP BID for GI 





Disposition


-Full code


-Will monitor overnight for any bleeds.  Repeat cardiac enzymes and CBC 











Visit type





- Emergency Visit


Emergency Visit: Yes


ED Registration Date: 10/20/17


Care time: The patient presented to the Emergency Department on the above date 

and was hospitalized for further evaluation of their emergent condition.





- New Patient


This patient is new to me today: Yes


Date on this admission: 10/20/17





- Critical Care


Critical Care patient: Yes


Total Critical Care Time (in minutes): 45


Critical Care Statement: The care of this patient involved high complexity 

decision making to prevent further life threatening deterioration of the patient

's condition and/or to evaluate & treat vital organ system(s) failure or risk 

of failure.

## 2017-10-21 LAB
ANION GAP SERPL CALC-SCNC: 7 MMOL/L (ref 8–16)
CALCIUM SERPL-MCNC: 7.9 MG/DL (ref 8.5–10.1)
CO2 SERPL-SCNC: 28 MMOL/L (ref 21–32)
CREAT SERPL-MCNC: 0.8 MG/DL (ref 0.7–1.3)
DEPRECATED RDW RBC AUTO: 12.5 % (ref 11.9–15.9)
DEPRECATED RDW RBC AUTO: 12.9 % (ref 11.9–15.9)
DEPRECATED RDW RBC AUTO: 13 % (ref 11.9–15.9)
GLUCOSE SERPL-MCNC: 109 MG/DL (ref 74–106)
MAGNESIUM SERPL-MCNC: 1.3 MG/DL (ref 1.8–2.4)
MCH RBC QN AUTO: 33.1 PG (ref 25.7–33.7)
MCH RBC QN AUTO: 33.9 PG (ref 25.7–33.7)
MCH RBC QN AUTO: 34.2 PG (ref 25.7–33.7)
MCHC RBC AUTO-ENTMCNC: 34.4 G/DL (ref 32–35.9)
MCHC RBC AUTO-ENTMCNC: 35 G/DL (ref 32–35.9)
MCHC RBC AUTO-ENTMCNC: 35.8 G/DL (ref 32–35.9)
MCV RBC: 95.5 FL (ref 80–96)
MCV RBC: 96 FL (ref 80–96)
MCV RBC: 96.8 FL (ref 80–96)
PHOSPHATE SERPL-MCNC: 3.2 MG/DL (ref 2.5–4.9)
PLATELET # BLD AUTO: 149 K/MM3 (ref 134–434)
PLATELET # BLD AUTO: 150 K/MM3 (ref 134–434)
PLATELET # BLD AUTO: 159 K/MM3 (ref 134–434)
PMV BLD: 6.9 FL (ref 7.5–11.1)
PMV BLD: 7 FL (ref 7.5–11.1)
PMV BLD: 7.6 FL (ref 7.5–11.1)
TROPONIN I SERPL-MCNC: 0.02 NG/ML (ref 0–0.05)
WBC # BLD AUTO: 11 K/MM3 (ref 4–10)
WBC # BLD AUTO: 13.4 K/MM3 (ref 4–10)
WBC # BLD AUTO: 9.4 K/MM3 (ref 4–10)

## 2017-10-21 RX ADMIN — MUPIROCIN SCH APPLIC: 20 OINTMENT TOPICAL at 10:15

## 2017-10-21 RX ADMIN — PANTOPRAZOLE SODIUM SCH MG: 40 INJECTION, POWDER, FOR SOLUTION INTRAVENOUS at 10:14

## 2017-10-21 RX ADMIN — NEBIVOLOL HYDROCHLORIDE SCH MG: 10 TABLET ORAL at 10:28

## 2017-10-21 RX ADMIN — PANTOPRAZOLE SODIUM SCH MG: 40 INJECTION, POWDER, FOR SOLUTION INTRAVENOUS at 21:47

## 2017-10-21 RX ADMIN — LISINOPRIL SCH MG: 10 TABLET ORAL at 10:14

## 2017-10-21 RX ADMIN — CHLORHEXIDINE GLUCONATE SCH APPLIC: 213 SOLUTION TOPICAL at 21:47

## 2017-10-21 RX ADMIN — MUPIROCIN SCH APPLIC: 20 OINTMENT TOPICAL at 21:53

## 2017-10-21 RX ADMIN — ATORVASTATIN CALCIUM SCH MG: 80 TABLET, FILM COATED ORAL at 21:47

## 2017-10-21 RX ADMIN — SODIUM CHLORIDE SCH MLS/HR: 9 INJECTION, SOLUTION INTRAVENOUS at 16:30

## 2017-10-21 NOTE — PN
Progress Note, Physician


History of Present Illness: 


No events, no BMs. Comfortable. Hgb between 7 and 8 over the last 24 hrs.





- Current Medication List


Current Medications: 


Active Medications





Atorvastatin Calcium (Lipitor -)  80 mg PO HS [facility]


Chlorhexidine Gluconate (Hibiclens For Decolonization -)  1 applic TP HS [facility]


   Last Admin: 10/20/17 21:50 Dose:  1 applic


Sodium Chloride (Normal Saline -)  1,000 mls @ 75 mls/hr IV ASDIR [facility]


   Last Admin: 10/21/17 16:30 Dose:  75 mls/hr


Lisinopril (Prinivil)  10 mg PO DAILY [facility]


   Last Admin: 10/21/17 10:14 Dose:  10 mg


Mupirocin (Bactroban Ointment (For Decolonization) -)  1 applic NS BID [facility]


   Stop: 10/25/17 21:59


   Last Admin: 10/21/17 10:15 Dose:  1 applic


Nebivolol (Bystolic -)  10 mg PO DAILY [facility]


   Last Admin: 10/21/17 10:28 Dose:  10 mg


Pantoprazole Sodium (Protonix Iv)  40 mg IVPUSH BID [facility]


   Stop: 10/23/17 23:59


   Last Admin: 10/21/17 10:14 Dose:  40 mg











- Objective


Vital Signs: 


 Vital Signs











Temperature  100.0 F H  10/21/17 17:15


 


Pulse Rate  62   10/21/17 17:15


 


Respiratory Rate  18   10/21/17 17:15


 


Blood Pressure  110/60   10/21/17 16:00


 


O2 Sat by Pulse Oximetry (%)  100   10/21/17 09:00











Constitutional: Yes: Well Nourished, No Distress


Eyes: Yes: Conjunctiva Clear


HENT: Yes: Atraumatic


Neck: Yes: Supple


Cardiovascular: Yes: Regular Rate and Rhythm


Respiratory: Yes: Regular


Gastrointestinal: Yes: Normal Bowel Sounds


Neurological: Yes: Alert, Oriented


Labs: 


 CBC, BMP





 10/21/17 15:00 





 10/21/17 05:45 





 INR, PTT











INR  1.06  (0.82-1.09)   10/20/17  09:00    








 Laboratory Results - last 24 hr











  10/20/17 10/21/17 10/21/17





  16:30 00:00 00:00


 


WBC   13.4 H 


 


RBC   2.46 L 


 


Hgb   8.1 L 


 


Hct   23.6 L 


 


MCV   96.0 


 


MCH   33.1 


 


MCHC   34.4 


 


RDW   12.5 


 


Plt Count   159 


 


MPV   7.6 


 


Sodium   


 


Potassium   


 


Chloride   


 


Carbon Dioxide   


 


Anion Gap   


 


BUN   


 


Creatinine   


 


Random Glucose   


 


Calcium   


 


Phosphorus   


 


Magnesium   


 


Troponin I  0.05  D   0.03  D














  10/21/17 10/21/17 10/21/17





  05:45 05:45 15:00


 


WBC  11.0 H   9.4


 


RBC  2.27 L   2.24 L


 


Hgb  7.8 L   7.6 L


 


Hct  21.7 L   21.6 L


 


MCV  95.5   96.8 H


 


MCH  34.2 H   33.9 H


 


MCHC  35.8   35.0


 


RDW  12.9   13.0


 


Plt Count  149   150


 


MPV  7.0 L   6.9 L


 


Sodium   139 


 


Potassium   3.9 


 


Chloride   104 


 


Carbon Dioxide   28 


 


Anion Gap   7 L 


 


BUN   25 H 


 


Creatinine   0.8  D 


 


Random Glucose   109 H 


 


Calcium   7.9 L 


 


Phosphorus   3.2 


 


Magnesium   1.3 L 


 


Troponin I   0.02 














Problem List





- Problems


(1) Gastrointestinal hemorrhage with melena


Code(s): K92.1 - MELENA





(2) NSAID long-term use


Code(s): Z79.1 - LONG TERM (CURRENT) USE OF NON-STEROIDAL NON-INFLAM (NSAID)





(3) Anemia associated with acute blood loss


Code(s): D62 - ACUTE POSTHEMORRHAGIC ANEMIA





(4) Duodenal hemorrhage


Code(s): K92.2 - GASTROINTESTINAL HEMORRHAGE, UNSPECIFIED





(5) Esophagitis determined by endoscopy


Code(s): K20.9 - ESOPHAGITIS, UNSPECIFIED








Assessment/Plan


s/p EGD with control of bleeding


2 5 mm shallow, bulb/1st portion actively oozing from visible vessel ulcers 

found and injected with a total of 12 cc 1:10,000 epi. Complete hemostasis was 

achieved. Resolution clips, despite multiple attempts, could not be deployed 

due to ulcers location.





No events. Asymptomatic in bed. Hgb around 7, no signs of ongoing bleeding.





Clearl liquid diet


Continue PPI


CBC AM


Follow biopsies and Hgb in office in 2 weeks


Discussed with the patent and his wife

## 2017-10-21 NOTE — PN
Progress Note (short form)





- Note


Progress Note: 


Subjective: The patient was seen and examined at the bedside, has no complaints 

at this time 


Hgb slightly downtrending but stable at 7.6





 Current Medications











Generic Name Dose Route Start Last Admin





  Trade Name Ignacio  PRN Reason Stop Dose Admin


 


Atorvastatin Calcium  80 mg 10/21/17 22:00  





  Lipitor -  PO   





  HS [facility]   


 


Chlorhexidine Gluconate  1 applic 10/20/17 22:00 10/20/17 21:50





  Hibiclens For Decolonization -  TP   1 applic





  HS [facility]   Administration


 


Sodium Chloride  1,000 mls @ 75 mls/hr 10/20/17 12:45 10/21/17 16:30





  Normal Saline -  IV   75 mls/hr





  ASDIR [facility]   Administration


 


Lisinopril  10 mg 10/21/17 10:00 10/21/17 10:14





  Prinivil  PO   10 mg





  DAILY [facility]   Administration


 


Mupirocin  1 applic 10/20/17 22:00 10/21/17 10:15





  Bactroban Ointment (For Decolonization) -  NS 10/25/17 21:59  1 applic





  BID [facility]   Administration


 


Nebivolol  10 mg 10/21/17 10:00 10/21/17 10:28





  Bystolic -  PO   10 mg





  DAILY [facility]   Administration


 


Pantoprazole Sodium  40 mg 10/20/17 22:00 10/21/17 10:14





  Protonix Iv  IVPUSH 10/23/17 23:59  40 mg





  BID [facility]   Administration








Objective: 


 Vital Signs











 Period  Temp  Pulse  Resp  BP Sys/Hinds  Pulse Ox


 


 Last 24 Hr  98 F-100.0 F  60-85  18-22  /52-70  100-100








Physical Exam: 


General: NAD, A&Ox3


Lungs: CTA bilaterally


Heart: RRR, S1S2


Abd: Soft, non-tender, non-distended. Normoactive bowel sounds


Ext: Warm, well-perfused. 2+ DP/PT bilaterally


Neuro: CN 2-12 intact





 CBCD











WBC  9.4 K/mm3 (4.0-10.0)   10/21/17  15:00    


 


RBC  2.24 M/mm3 (4.00-5.60)  L  10/21/17  15:00    


 


Hgb  7.6 GM/dL (11.7-16.9)  L  10/21/17  15:00    


 


Hct  21.6 % (35.4-49)  L  10/21/17  15:00    


 


MCV  96.8 fl (80-96)  H  10/21/17  15:00    


 


MCHC  35.0 g/dl (32.0-35.9)   10/21/17  15:00    


 


RDW  13.0 % (11.9-15.9)   10/21/17  15:00    


 


Plt Count  150 K/MM3 (134-434)   10/21/17  15:00    


 


MPV  6.9 fl (7.5-11.1)  L  10/21/17  15:00    








 CMP











Sodium  139 mmol/L (136-145)   10/21/17  05:45    


 


Potassium  3.9 mmol/L (3.5-5.1)   10/21/17  05:45    


 


Chloride  104 mmol/L ()   10/21/17  05:45    


 


Carbon Dioxide  28 mmol/L (21-32)   10/21/17  05:45    


 


Anion Gap  7  (8-16)  L  10/21/17  05:45    


 


BUN  25 mg/dL (7-18)  H  10/21/17  05:45    


 


Creatinine  0.8 mg/dL (0.7-1.3)  D 10/21/17  05:45    


 


Creat Clearance w eGFR  > 60  (>60)   10/20/17  09:00    


 


Random Glucose  109 mg/dL ()  H  10/21/17  05:45    


 


Calcium  7.9 mg/dL (8.5-10.1)  L  10/21/17  05:45    


 


Total Bilirubin  1.0 mg/dl (0.2-1.0)  D 10/20/17  09:00    


 


AST  26 U/L (10-42)  D 10/20/17  09:00    


 


ALT  31 U/L (10-40)  D 10/20/17  09:00    


 


Alkaline Phosphatase  47 U/L (32-92)   10/20/17  09:00    


 


Total Protein  5.7 g/dl (6.4-8.3)  L  10/20/17  09:00    


 


Albumin  3.7 g/dl (3.5-5.0)   10/20/17  09:00    








 CARDIAC ENZYMES











Creatine Kinase  182 IU/L ()   10/20/17  09:00    


 


Troponin I  0.02 ng/ml (0.00-0.05)   10/21/17  05:45    








Assessment: This is a 62 year old male with PMHx of HTN, hyperlipidemia, CAD, 

MI s/p stents on Plavix, GERD, s/p H.pylori treatment who was admitted with 

symptomatic anemia in the setting of an upper GI bleed. 





Plan:


1) GI: Upper GI bleed


- EGD with 2, 5mm shallow bulb/1st portion actively oozing ulcers, injected 

with epi, complete hemostasis achieved


- Waiting EGD report


- Monitor Hgb q12h


- Transfuse if Hgb <7


- Continue Protonix bid


- F/u biopsies in the office in 2 weeks


- Advance diet per GI


- Appreciate GI consult





2) Cardiology: CAD s/p MI with stenting 5 years ago


- Hold ASA and Plavix 2/2 GI bleed. Resume when cleared by GI


- Trops x3 negative 


HTN


- Continue Lisinopril


- Continue bystolic


Hyperlipidemia:


- Continue Lipitor





3) Hematology: Acute blood loss anemia 2/2 bleeding ulcers


- Monitor Hgb, if <7, will transfuse





4) F/E/N:


- Clear liquid diet


- Monitor electrolytes


- Hypomagnesemia: replete





5) Prophylaxis: 


- Hold all chemical dvt prophylaxis 2/2 GI bleed


- SCDs bilaterally





6) Dispo:


- Requires continued inpatient care





CODE STATUS: FULL CODE





Visit type





- Emergency Visit


Emergency Visit: Yes


ED Registration Date: 10/20/17


Care time: The patient presented to the Emergency Department on the above date 

and was hospitalized for further evaluation of their emergent condition.





- New Patient


This patient is new to me today: Yes


Date on this admission: 10/21/17





- Critical Care


Critical Care patient: No

## 2017-10-21 NOTE — PN
Progress Note (short form)





- Note


Progress Note: 


Seen and examined in the ICU


HGB stable after EGD: found bleeding gastric ulcer injected w/ EPI


tolerating clears


troponin neg x3


denies: CP/SOB/HA/F/C/N/V/diarrhea/melena





 Current Medications





Atorvastatin Calcium (Lipitor -)  80 mg PO HS [facility]


Chlorhexidine Gluconate (Hibiclens For Decolonization -)  1 applic TP HS [facility]


   Last Admin: 10/20/17 21:50 Dose:  1 applic


Sodium Chloride (Normal Saline -)  1,000 mls @ 75 mls/hr IV ASDIR [facility]


   Last Admin: 10/20/17 17:19 Dose:  75 mls/hr


Lisinopril (Prinivil)  10 mg PO DAILY [facility]


Mupirocin (Bactroban Ointment (For Decolonization) -)  1 applic NS BID [facility]


   Stop: 10/25/17 21:59


   Last Admin: 10/20/17 21:50 Dose:  1 applic


Nebivolol (Bystolic -)  10 mg PO DAILY [facility]


Pantoprazole Sodium (Protonix Iv)  40 mg IVPUSH BID [facility]


   Stop: 10/23/17 23:59


   Last Admin: 10/20/17 21:50 Dose:  40 mg





 Vital Signs











 Period  Temp  Pulse  Resp  BP Sys/Hinds  Pulse Ox


 


 Last 24 Hr  97.5 F-99 F  53-85  16-23  /31-91  








 Intake & Output











 10/18/17 10/19/17 10/20/17 10/21/17





 23:59 23:59 23:59 23:59


 


Intake Total   2600 900


 


Output Total   1450 600


 


Balance   1150 300


 


Weight   96.162 kg 








General: awake, alert and cooperative


HEENT: PERRL


CV: s1, s2 RRR


Pulm: CTA


Ext: WWP +2 pulses


Neuro: grossly intact





 CBCD











WBC  11.0 K/mm3 (4.0-10.0)  H  10/21/17  05:45    


 


RBC  2.27 M/mm3 (4.00-5.60)  L  10/21/17  05:45    


 


Hgb  7.8 GM/dL (11.7-16.9)  L  10/21/17  05:45    


 


Hct  21.7 % (35.4-49)  L  10/21/17  05:45    


 


MCV  95.5 fl (80-96)   10/21/17  05:45    


 


MCHC  35.8 g/dl (32.0-35.9)   10/21/17  05:45    


 


RDW  12.9 % (11.9-15.9)   10/21/17  05:45    


 


Plt Count  149 K/MM3 (134-434)   10/21/17  05:45    


 


MPV  7.0 fl (7.5-11.1)  L  10/21/17  05:45    








 CMP











Sodium  139 mmol/L (136-145)   10/21/17  05:45    


 


Potassium  3.9 mmol/L (3.5-5.1)   10/21/17  05:45    


 


Chloride  104 mmol/L ()   10/21/17  05:45    


 


Carbon Dioxide  28 mmol/L (21-32)   10/21/17  05:45    


 


Anion Gap  7  (8-16)  L  10/21/17  05:45    


 


BUN  25 mg/dL (7-18)  H  10/21/17  05:45    


 


Creatinine  0.8 mg/dL (0.7-1.3)  D 10/21/17  05:45    


 


Creat Clearance w eGFR  > 60  (>60)   10/20/17  09:00    


 


Random Glucose  109 mg/dL ()  H  10/21/17  05:45    


 


Calcium  7.9 mg/dL (8.5-10.1)  L  10/21/17  05:45    


 


Total Bilirubin  1.0 mg/dl (0.2-1.0)  D 10/20/17  09:00    


 


AST  26 U/L (10-42)  D 10/20/17  09:00    


 


ALT  31 U/L (10-40)  D 10/20/17  09:00    


 


Alkaline Phosphatase  47 U/L (32-92)   10/20/17  09:00    


 


Total Protein  5.7 g/dl (6.4-8.3)  L  10/20/17  09:00    


 


Albumin  3.7 g/dl (3.5-5.0)   10/20/17  09:00    








 CARDIAC ENZYMES











Creatine Kinase  182 IU/L ()   10/20/17  09:00    


 


Troponin I  0.02 ng/ml (0.00-0.05)   10/21/17  05:45    








Problem List





- Problems


(1) Anemia associated with acute blood loss


Code(s): D62 - ACUTE POSTHEMORRHAGIC ANEMIA





(2) Duodenal hemorrhage


Code(s): K92.2 - GASTROINTESTINAL HEMORRHAGE, UNSPECIFIED





(3) Esophagitis determined by endoscopy


Code(s): K20.9 - ESOPHAGITIS, UNSPECIFIED





(4) Gastrointestinal hemorrhage with melena


Code(s): K92.1 - MELENA





(5) NSAID long-term use


Code(s): Z79.1 - LONG TERM (CURRENT) USE OF NON-STEROIDAL NON-INFLAM (NSAID)





(6) Upper GI bleed


Code(s): K92.2 - GASTROINTESTINAL HEMORRHAGE, UNSPECIFIED








Assessment/Plan


62yom with PMHx HTN, HLD, CAD, MI s/p stents on Plavix, GERD, s/p H Pylori 

treatment admitted with symptomatic anemia in setting of upper GIB. Transferred 

to ICU after EGD and stabilization of bleeding gastric ulcers for observation. 





Plan:


-Hgb q12


-Transfuse for Hgb<7


-wean off O2 today


-Incentive edie


-Cont PPI BID


-advance diet per GI, currently clears


-DVT proph with SCD





Stable for floor transfer





Leanne RUIZP


Pulm/CCM





CCT: 35m

## 2017-10-21 NOTE — EKG
Test Reason : 

Blood Pressure : ***/*** mmHG

Vent. Rate : 062 BPM     Atrial Rate : 062 BPM

   P-R Int : 180 ms          QRS Dur : 128 ms

    QT Int : 412 ms       P-R-T Axes : 064 -52 076 degrees

   QTc Int : 418 ms

 

NORMAL SINUS RHYTHM

LEFT AXIS DEVIATION

LEFT VENTRICULAR HYPERTROPHY WITH QRS WIDENING AND REPOLARIZATION

ABNORMALITY

ABNORMAL ECG

WHEN COMPARED WITH ECG OF 01-JUL-2012 11:06,

NONSPECIFIC T WAVE ABNORMALITY NO LONGER EVIDENT IN INFERIOR LEADS

CLINICAL CORRELATION IS RECOMMENDED

Confirmed by CELIO MALAGON, BRUNO ([id number]) on 10/21/2017 1:14:49 PM

 

Referred By:             Confirmed By:BRUNO PICKENS MD

## 2017-10-22 LAB
ALBUMIN SERPL-MCNC: 2.8 G/DL (ref 3.4–5)
ALP SERPL-CCNC: 31 U/L (ref 45–117)
ALT SERPL-CCNC: 25 U/L (ref 12–78)
ANION GAP SERPL CALC-SCNC: 8 MMOL/L (ref 8–16)
AST SERPL-CCNC: 15 U/L (ref 15–37)
BILIRUB SERPL-MCNC: 0.5 MG/DL (ref 0.2–1)
CALCIUM SERPL-MCNC: 7.5 MG/DL (ref 8.5–10.1)
CO2 SERPL-SCNC: 25 MMOL/L (ref 21–32)
CREAT SERPL-MCNC: 0.8 MG/DL (ref 0.7–1.3)
DEPRECATED RDW RBC AUTO: 12.7 % (ref 11.9–15.9)
DEPRECATED RDW RBC AUTO: 12.9 % (ref 11.9–15.9)
GLUCOSE SERPL-MCNC: 101 MG/DL (ref 74–106)
MAGNESIUM SERPL-MCNC: 1.7 MG/DL (ref 1.8–2.4)
MCH RBC QN AUTO: 33.5 PG (ref 25.7–33.7)
MCH RBC QN AUTO: 34.1 PG (ref 25.7–33.7)
MCHC RBC AUTO-ENTMCNC: 34.5 G/DL (ref 32–35.9)
MCHC RBC AUTO-ENTMCNC: 35.4 G/DL (ref 32–35.9)
MCV RBC: 96.2 FL (ref 80–96)
MCV RBC: 97.2 FL (ref 80–96)
PLATELET # BLD AUTO: 156 K/MM3 (ref 134–434)
PLATELET # BLD AUTO: 178 K/MM3 (ref 134–434)
PMV BLD: 6.9 FL (ref 7.5–11.1)
PMV BLD: 7.1 FL (ref 7.5–11.1)
PROT SERPL-MCNC: 5 G/DL (ref 6.4–8.2)
WBC # BLD AUTO: 8.5 K/MM3 (ref 4–10)
WBC # BLD AUTO: 9.1 K/MM3 (ref 4–10)

## 2017-10-22 RX ADMIN — PANTOPRAZOLE SODIUM SCH MG: 40 TABLET, DELAYED RELEASE ORAL at 09:57

## 2017-10-22 RX ADMIN — LISINOPRIL SCH MG: 10 TABLET ORAL at 09:57

## 2017-10-22 RX ADMIN — NEBIVOLOL HYDROCHLORIDE SCH MG: 10 TABLET ORAL at 10:25

## 2017-10-22 RX ADMIN — PANTOPRAZOLE SODIUM SCH MG: 40 TABLET, DELAYED RELEASE ORAL at 21:20

## 2017-10-22 RX ADMIN — ATORVASTATIN CALCIUM SCH MG: 80 TABLET, FILM COATED ORAL at 21:20

## 2017-10-22 NOTE — PN
Progress Note (short form)





- Note


Progress Note: 


Subjective: The patient was seen and examined at the bedside, has no complaints 

at this time. He reports having a solid black bowel movement this morning





 Current Medications











Generic Name Dose Route Start Last Admin





  Trade Name Ignacio  PRN Reason Stop Dose Admin


 


Atorvastatin Calcium  80 mg 10/21/17 22:00 10/21/17 21:47





  Lipitor -  PO   80 mg





  HS [facility]   Administration


 


Lisinopril  10 mg 10/21/17 10:00 10/22/17 09:57





  Prinivil  PO   10 mg





  DAILY [facility]   Administration


 


Nebivolol  10 mg 10/21/17 10:00 10/22/17 10:25





  Bystolic -  PO   10 mg





  DAILY [facility]   Administration


 


Pantoprazole Sodium  40 mg 10/22/17 10:00 10/22/17 09:57





  Protonix -  PO   40 mg





  BID [facility]   Administration








Objective: 


 


 Vital Signs











 Period  Temp  Pulse  Resp  BP Sys/Hinds  Pulse Ox


 


 Last 24 Hr  98 F-100.0 F  59-73  18-20  /54-64  








Physical Exam: 


General: NAD, A&Ox3


Lungs: CTA bilaterally


Heart: RRR, S1S2


Abd: Soft, non-tender, non-distended. Normoactive bowel sounds


Ext: Warm, well-perfused. 2+ DP/PT bilaterally


Neuro: CN 2-12 intact





 CBCD











WBC  9.1 K/mm3 (4.0-10.0)   10/22/17  08:35    


 


RBC  2.15 M/mm3 (4.00-5.60)  L  10/22/17  08:35    


 


Hgb  7.3 GM/dL (11.7-16.9)  L  10/22/17  08:35    


 


Hct  20.7 % (35.4-49)  L  10/22/17  08:35    


 


MCV  96.2 fl (80-96)  H  10/22/17  08:35    


 


MCHC  35.4 g/dl (32.0-35.9)   10/22/17  08:35    


 


RDW  12.7 % (11.9-15.9)   10/22/17  08:35    


 


Plt Count  156 K/MM3 (134-434)   10/22/17  08:35    


 


MPV  7.1 fl (7.5-11.1)  L  10/22/17  08:35    








 CMP











Sodium  140 mmol/L (136-145)   10/22/17  08:35    


 


Potassium  3.8 mmol/L (3.5-5.1)   10/22/17  08:35    


 


Chloride  107 mmol/L ()   10/22/17  08:35    


 


Carbon Dioxide  25 mmol/L (21-32)   10/22/17  08:35    


 


Anion Gap  8  (8-16)   10/22/17  08:35    


 


BUN  16 mg/dL (7-18)  D 10/22/17  08:35    


 


Creatinine  0.8 mg/dL (0.7-1.3)   10/22/17  08:35    


 


Creat Clearance w eGFR  > 60  (>60)   10/22/17  08:35    


 


Random Glucose  101 mg/dL ()   10/22/17  08:35    


 


Calcium  7.5 mg/dL (8.5-10.1)  L  10/22/17  08:35    


 


Total Bilirubin  0.5 mg/dL (0.2-1.0)   10/22/17  08:35    


 


AST  15 U/L (15-37)  D 10/22/17  08:35    


 


ALT  25 U/L (12-78)  D 10/22/17  08:35    


 


Alkaline Phosphatase  31 U/L ()  L D 10/22/17  08:35    


 


Total Protein  5.0 g/dl (6.4-8.2)  L D 10/22/17  08:35    


 


Albumin  2.8 g/dl (3.4-5.0)  L D 10/22/17  08:35    








 CARDIAC ENZYMES











Creatine Kinase  182 IU/L ()   10/20/17  09:00    


 


Troponin I  0.02 ng/ml (0.00-0.05)   10/21/17  05:45    











Assessment: This is a 62 year old male with PMHx of HTN, hyperlipidemia, CAD, 

MI s/p stents on Plavix, GERD, s/p H.pylori treatment who was admitted with 

symptomatic anemia in the setting of an upper GI bleed. 





Plan:


1) GI: Upper GI bleed


- EGD with 2, 5mm shallow bulb/1st portion actively oozing ulcers, injected 

with epi, complete hemostasis achieved


- Waiting EGD report


- Monitor Hgb q12h


- Transfuse if Hgb <7


- Continue Protonix bid


- F/u biopsies in the office in 2 weeks


- Advance diet per GI, remains on clear liquid diet


- Appreciate GI consult





2) Cardiology: CAD s/p MI with stenting 5 years ago


- Hold ASA and Plavix 2/2 GI bleed. Resume when cleared by GI


- Trops x3 negative 


HTN


- Continue Lisinopril


- Continue bystolic


Hyperlipidemia:


- Continue Lipitor





3) Hematology: Acute blood loss anemia 2/2 bleeding ulcers


- Monitor Hgb, if <7, will transfuse





4) F/E/N:


- Clear liquid diet


- Monitor electrolytes


- Hypomagnesemia: replete





5) Prophylaxis: 


- Hold all chemical dvt prophylaxis 2/2 GI bleed


- SCDs bilaterally





6) Dispo:


- Requires continued inpatient care





CODE STATUS: FULL CODE





Visit type





- Emergency Visit


Emergency Visit: Yes


ED Registration Date: 10/20/17


Care time: The patient presented to the Emergency Department on the above date 

and was hospitalized for further evaluation of their emergent condition.





- New Patient


This patient is new to me today: No





- Critical Care


Critical Care patient: No

## 2017-10-22 NOTE — PN
Progress Note, Physician


History of Present Illness: 


No events, Comfortable. Hgb between 7 and 8 over the last 24 hrs.








- Current Medication List


Current Medications: 


Active Medications





Atorvastatin Calcium (Lipitor -)  80 mg PO HS [facility]


   Last Admin: 10/21/17 21:47 Dose:  80 mg


Lisinopril (Prinivil)  10 mg PO DAILY [facility]


   Last Admin: 10/22/17 09:57 Dose:  10 mg


Nebivolol (Bystolic -)  10 mg PO DAILY [facility]


   Last Admin: 10/22/17 10:25 Dose:  10 mg


Pantoprazole Sodium (Protonix -)  40 mg PO BID [facility]


   Last Admin: 10/22/17 09:57 Dose:  40 mg











- Objective


Vital Signs: 


 Vital Signs











Temperature  98.7 F   10/22/17 08:56


 


Pulse Rate  59 L  10/22/17 08:56


 


Respiratory Rate  20   10/22/17 08:56


 


Blood Pressure  107/57   10/22/17 08:56


 


O2 Sat by Pulse Oximetry (%)  97   10/22/17 09:00











Constitutional: Yes: Well Nourished, No Distress, Calm


Eyes: Yes: Conjunctiva Clear


HENT: Yes: Atraumatic


Neck: Yes: Supple


Cardiovascular: Yes: Regular Rate and Rhythm


Respiratory: Yes: Regular, CTA Bilaterally


Gastrointestinal: Yes: Normal Bowel Sounds, Soft.  No: Tenderness


Neurological: Yes: Alert, Oriented


Labs: 


 CBC, BMP





 10/22/17 08:35 





 10/22/17 08:35 





 INR, PTT











INR  1.06  (0.82-1.09)   10/20/17  09:00    








 Laboratory Results - last 24 hr











  10/21/17 10/22/17 10/22/17





  15:00 08:35 08:35


 


WBC  9.4  9.1 


 


RBC  2.24 L  2.15 L 


 


Hgb  7.6 L  7.3 L 


 


Hct  21.6 L  20.7 L 


 


MCV  96.8 H  96.2 H 


 


MCH  33.9 H  34.1 H 


 


MCHC  35.0  35.4 


 


RDW  13.0  12.7 


 


Plt Count  150  156 


 


MPV  6.9 L  7.1 L 


 


Sodium    140


 


Potassium    3.8


 


Chloride    107


 


Carbon Dioxide    25


 


Anion Gap    8


 


BUN    16  D


 


Creatinine    0.8


 


Creat Clearance w eGFR    > 60


 


Random Glucose    101


 


Calcium    7.5 L


 


Magnesium    1.7 L D


 


Total Bilirubin    0.5


 


AST    15  D


 


ALT    25  D


 


Alkaline Phosphatase    31 L D


 


Total Protein    5.0 L D


 


Albumin    2.8 L D














Problem List





- Problems


(1) Gastrointestinal hemorrhage with melena


Code(s): K92.1 - MELENA





(2) NSAID long-term use


Code(s): Z79.1 - LONG TERM (CURRENT) USE OF NON-STEROIDAL NON-INFLAM (NSAID)





(3) Anemia associated with acute blood loss


Code(s): D62 - ACUTE POSTHEMORRHAGIC ANEMIA





(4) Duodenal hemorrhage


Code(s): K92.2 - GASTROINTESTINAL HEMORRHAGE, UNSPECIFIED





(5) Esophagitis determined by endoscopy


Code(s): K20.9 - ESOPHAGITIS, UNSPECIFIED








Assessment/Plan


s/p EGD with control of bleeding


2 5 mm shallow, bulb/1st portion actively oozing from visible vessel ulcers 

found and injected with a total of 12 cc 1:10,000 epi. Complete hemostasis was 

achieved. Resolution clips, despite multiple attempts, could not be deployed 

due to ulcers location.





No events. Asymptomatic in bed. Hgb around 7, no signs of ongoing bleeding.





PPI po bid and advancing diet


d/c planning if hgb remain stable over the next 24 hrs


Follow biopsies and Hgb in office in 2 weeks


Discussed with the patent and his wife

## 2017-10-23 LAB
ALBUMIN SERPL-MCNC: 2.8 G/DL (ref 3.4–5)
ALP SERPL-CCNC: 35 U/L (ref 45–117)
ALT SERPL-CCNC: 28 U/L (ref 12–78)
ANION GAP SERPL CALC-SCNC: 6 MMOL/L (ref 8–16)
AST SERPL-CCNC: 19 U/L (ref 15–37)
BASOPHILS # BLD: 0.7 % (ref 0–2)
BILIRUB SERPL-MCNC: 0.7 MG/DL (ref 0.2–1)
CALCIUM SERPL-MCNC: 7.7 MG/DL (ref 8.5–10.1)
CO2 SERPL-SCNC: 26 MMOL/L (ref 21–32)
CREAT SERPL-MCNC: 0.8 MG/DL (ref 0.7–1.3)
DEPRECATED RDW RBC AUTO: 12.9 % (ref 11.9–15.9)
DEPRECATED RDW RBC AUTO: 14.1 % (ref 11.9–15.9)
EOSINOPHIL # BLD: 4 % (ref 0–4.5)
GLUCOSE SERPL-MCNC: 97 MG/DL (ref 74–106)
MCH RBC QN AUTO: 33.1 PG (ref 25.7–33.7)
MCH RBC QN AUTO: 33.9 PG (ref 25.7–33.7)
MCHC RBC AUTO-ENTMCNC: 34.5 G/DL (ref 32–35.9)
MCHC RBC AUTO-ENTMCNC: 35.4 G/DL (ref 32–35.9)
MCV RBC: 95.8 FL (ref 80–96)
MCV RBC: 96 FL (ref 80–96)
NEUTROPHILS # BLD: 56.3 % (ref 42.8–82.8)
PLATELET # BLD AUTO: 162 K/MM3 (ref 134–434)
PLATELET # BLD AUTO: 194 K/MM3 (ref 134–434)
PMV BLD: 6.9 FL (ref 7.5–11.1)
PMV BLD: 7.1 FL (ref 7.5–11.1)
PROT SERPL-MCNC: 5 G/DL (ref 6.4–8.2)
WBC # BLD AUTO: 6.3 K/MM3 (ref 4–10)
WBC # BLD AUTO: 6.9 K/MM3 (ref 4–10)

## 2017-10-23 PROCEDURE — 30233N1 TRANSFUSION OF NONAUTOLOGOUS RED BLOOD CELLS INTO PERIPHERAL VEIN, PERCUTANEOUS APPROACH: ICD-10-PCS | Performed by: NURSE PRACTITIONER

## 2017-10-23 RX ADMIN — NEBIVOLOL HYDROCHLORIDE SCH MG: 10 TABLET ORAL at 12:16

## 2017-10-23 RX ADMIN — PANTOPRAZOLE SODIUM SCH MG: 40 TABLET, DELAYED RELEASE ORAL at 10:04

## 2017-10-23 RX ADMIN — ATORVASTATIN CALCIUM SCH MG: 80 TABLET, FILM COATED ORAL at 21:17

## 2017-10-23 RX ADMIN — LISINOPRIL SCH MG: 10 TABLET ORAL at 10:04

## 2017-10-23 RX ADMIN — PANTOPRAZOLE SODIUM SCH MG: 40 TABLET, DELAYED RELEASE ORAL at 21:18

## 2017-10-23 NOTE — PN
Physical Exam: 


SUBJECTIVE: Patient seen and examined at the bedside.





OBJECTIVE:


Hmg/hct remains low/stable.


He is asymptomatic, denies dizziness with ambulation.


Pt has cardiac history of MI with stents and presented with hmg of 10


As per cardiology recommendations, will transfuse 1 unit of prbc.


Check CBC after blood transfusion and in the a.m., if above 8hmg, will d/c in 

a.m. with close GI and cardiology follow-up.


Started him on Ferrous sulfate daily





 Vital Signs











 Period  Temp  Pulse  Resp  BP Sys/Hinds  Pulse Ox


 


 Last 24 Hr  98.0 F-99.0 F  55-68  18-21  100-126/56-65  96








GENERAL: The patient is awake, alert, and fully oriented, in no acute distress.


HEAD: Normal with no signs of trauma.


EYES: PERRL, extraocular movements intact, sclera anicteric, conjunctiva clear. 

No ptosis. 


ENT: Ears normal, nares patent, oropharynx clear without exudates, moist mucous 

membranes.


NECK: Trachea midline, full range of motion, supple. 


LUNGS: Breath sounds equal, clear to auscultation bilaterally, no wheezes, no 

crackles, no accessory muscle use. 


HEART: Regular rate and rhythm


ABDOMEN: Soft, nontender, nondistended, normoactive bowel sounds, no guarding, 

no rebound, no hepatosplenomegaly, no masses.


EXTREMITIES: 2+ pulses, warm, well-perfused, no edema. 


NEUROLOGICAL:  Normal speech, gait not observed.


PSYCH: Normal mood, normal affect.


SKIN: Warm, dry, normal turgor, no rashes or lesions noted


 Laboratory Results - last 24 hr











  10/22/17 10/23/17 10/23/17





  15:00 07:35 07:35


 


WBC  8.5  6.9 


 


RBC  2.21 L  2.18 L 


 


Hgb  7.4 L  7.4 L 


 


Hct  21.5 L  21.0 L 


 


MCV  97.2 H  96.0 


 


MCH  33.5  33.9 H 


 


MCHC  34.5  35.4 


 


RDW  12.9  12.9 


 


Plt Count  178  162 


 


MPV  6.9 L  6.9 L 


 


Sodium    141


 


Potassium    4.0


 


Chloride    109 H


 


Carbon Dioxide    26


 


Anion Gap    6 L


 


BUN    10  D


 


Creatinine    0.8


 


Creat Clearance w eGFR    > 60


 


Random Glucose    97


 


Calcium    7.7 L


 


Total Bilirubin    0.7  D


 


AST    19  D


 


ALT    28


 


Alkaline Phosphatase    35 L


 


Total Protein    5.0 L


 


Albumin    2.8 L








Active Medications











Generic Name Dose Route Start Last Admin





  Trade Name Freq  PRN Reason Stop Dose Admin


 


Atorvastatin Calcium  80 mg 10/21/17 22:00 10/22/17 21:20





  Lipitor -  PO   80 mg





  HS [facility]   Administration


 


Lisinopril  10 mg 10/21/17 10:00 10/23/17 10:04





  Prinivil  PO   10 mg





  DAILY [facility]   Administration


 


Nebivolol  10 mg 10/21/17 10:00 10/22/17 10:25





  Bystolic -  PO   10 mg





  DAILY [facility]   Administration


 


Pantoprazole Sodium  40 mg 10/22/17 10:00 10/23/17 10:04





  Protonix -  PO   40 mg





  BID [facility]   Administration











ASSESSMENT/PLAN:





Patient is a 62 year old male with a significant past medical history of 

hypertension, HLD, CAD/MI s/p stent in 2012 on Plavix, H pylori and GERD.  He 

presents to the ED with complaints of worsening lightheadedness, weakness, MOULTON, 

palpitations, diaphoresis, mild RUQ pain.   He was admitted on 10/20/2017 with 

symptomatic anemia and was found to have an Upper GI bleed.  





GI: 


Upper GI bleed, resolved


Repaired with EGD that found actively bleeding uclers, s/p EPI injections


On admission hmg 10, trended down to 7.4


He has a cardiac history with stents, goal is to keep hmg @ 8 or above, will 

transfuse 1 unit of prbc and if stable in a.m. will d/c home with close GI and 

cardiology follow up


Cardiologist consulted, he is a patient of Dr. Rivas


CBC after blood transfusion and again in the a.m 


Continue Protonix BID, will need outpatient GI follow up


Advanced diet to full liquids today, regular in a.m. 


Patient to follow up with biopsies in 2 weeks with Dr. Marshall





Hematology:


Symptomatic anemia/acute blood loss anemia s/2 to upper gi bleed/ucers


1 unit of prbc today to maintain hmg above 8 (cardiac history)


CBC post transfusion and a.m.





Cardiology: 


CAD s/p MI with stenting


Continue to hold ASA and Plavix, to restart once cleared by GI


On Bystolic and Lisinopril





F.E.N.


Fluids: tolerating PO


Electrolytes: monitor in a.m., all within normal limits today


Nutrition: advanced diet to full liquids





Prophylaxis:


GI: Protonix BID


DVT: SCDs when in bed.





Disposition:  Requires inpatient hospitalization.  Discharge tomorrow once hmg/

hct more stable.  full code.

















Visit type





- Emergency Visit


Emergency Visit: Yes


ED Registration Date: 10/20/17


Care time: The patient presented to the Emergency Department on the above date 

and was hospitalized for further evaluation of their emergent condition.





- New Patient


This patient is new to me today: Yes


Date on this admission: 10/23/17





- Critical Care


Critical Care patient: No





- Discharge Referral


Referred to [facility] No

## 2017-10-23 NOTE — CON.CARD
Consult


Consult Specialty:: Cardiology


Referred by:: Hospitalist Medicine


Reason for Consultation:: Antiplatelet therapy recs





- History of Present Illness


Chief Complaint: Dizziness, fatigue


History of Present Illness: 


61 yo male with h/o CAD s/p MI, s/p ZAC 2012, angina pectoris, HTN, chol 

presented to ED with c/o fatigue, weakness, melena and positional 

lightheadedness on standing up. No chest pain, nausea, vomiting, palpations, 

diaphoresis, or numbness. Deneis mil hematocheziam hematemesis, diarrhea, 

dysphasia, odynophagia. Reports history of H. pylori infection about 20 y. ago. 

Takes daily NSAIDs for pains and aches, and tums at night for "acid reflux". No 

significant weight loss. 





- History Source


History Provided By: Patient


Limitations to Obtaining History: No Limitations





- Past Medical History


Cardio/Vascular: Yes: CAD, HTN, Hyperlipdemia, MI


Gastrointestinal: Yes: GERD, Peptic Ulcer Disease


Hepatobiliary: No: Cirrhosis, Cholelithiasis, Hepatitis B, Hepatitis C





- Past Surgical History


Past Surgical History: Yes: Stent


Additional Surgical History: EGD 20 y ago





- Alcohol/Substance Use


Hx Alcohol Use: Yes (daily 5 beers and wine and beer on weekends)





- Smoking History


Smoking history: Former smoker


Have you smoked in the past 12 months: No


Aproximately how many cigarettes per day: 20


If you are a former smoker, when did you quit?: at the age  of 18





- Social History


Usual Living Arrangement: With Spouse


History of Recent Travel: No





Home Medications





- Allergies


Allergies/Adverse Reactions: 


 Allergies











Allergy/AdvReac Type Severity Reaction Status Date / Time


 


iodine [Iodine] Allergy  Rash Verified 10/20/17 08:23














- Home Medications


Home Medications: 


Ambulatory Orders





Atorvastatin Ca [Lipitor (Restricted To Cardiology)] 80 mg PO HS 04/22/12 


Lisinopril [Prinivil] 10 mg PO DAILY 06/01/12 


Aspirin [ASA] 125 mg PO DAILY 07/01/12 


Nebivolol [Bystolic] 10 mg PO HS 07/01/12 


Antiox #11/Om3/Dha/Epa/Lut/Yun [Eye Health Adult 50+ Softgel] 1 each PO 10/20/

17 


Brain Boost  10/20/17 


Clopidogrel Bisulfate [Plavix -] 75 mg PO DAILY 10/20/17 


Herbal Drugs [Colon Herbal Cleanser] 1 each PO 10/20/17 


Liver Cleans  10/20/17 











Family Disease History





- Family Disease History


Family Disease History: Other: Father (Gastric ulcers)





Review of Systems





- Review of Systems


Constitutional: reports: Lethargy, Weakness


Cardiovascular: reports: Shortness of Breath


Neurological: reports: Dizziness


Vital Signs: 


 Vital Signs











Temperature  98.8 F   10/23/17 08:00


 


Pulse Rate  60   10/23/17 08:00


 


Respiratory Rate  18   10/23/17 08:00


 


Blood Pressure  126/65   10/23/17 08:00


 


O2 Sat by Pulse Oximetry (%)  96   10/22/17 20:54











Constitutional: Yes: No Distress, Calm


Neck: Yes: Supple


Respiratory: Yes: Regular, CTA Bilaterally


Gastrointestinal: Yes: Soft, Hypoactive Bowel Sounds


Cardiovascular: Yes: Regular Rate and Rhythm


JVD: No


Carotid Bruit: No


Heart Sounds: Yes: S1, S2


Edema: No





- Other Data


Labs, Other Data: 


 CBC, BMP





 10/23/17 07:35 





 10/23/17 07:35 





 INR, PTT











INR  1.06  (0.82-1.09)   10/20/17  09:00    














Imaging





- Results


Chest X-ray: Report Reviewed (NAD)


EKG: Report Reviewed (NSR @ 62 LAD, LVH with repol abnl)





Problem List





- Problems


(1) Anemia associated with acute blood loss


Code(s): D62 - ACUTE POSTHEMORRHAGIC ANEMIA





(2) Gastrointestinal hemorrhage with melena


Code(s): K92.1 - MELENA





(3) NSAID long-term use


Code(s): Z79.1 - LONG TERM (CURRENT) USE OF NON-STEROIDAL NON-INFLAM (NSAID)





(4) Upper GI bleed


Code(s): K92.2 - GASTROINTESTINAL HEMORRHAGE, UNSPECIFIED





(5) Coronary artery disease


Code(s): I25.10 - ATHSCL HEART DISEASE OF NATIVE CORONARY ARTERY W/O ANG PCTRS 

  Qualifiers: 


     Coronary Disease-Associated Artery/Lesion type: native artery     Native 

vs. transplanted heart: native heart     Associated angina: without angina     

   Qualified Code(s): I25.10 - Atherosclerotic heart disease of native coronary 

artery without angina pectoris; I25.10 - Atherosclerotic heart disease of 

native coronary artery without angina pectoris; I25.10 - Atherosclerotic heart 

disease of native coronary artery without angina pectoris  





(6) Status post coronary artery stent placement


Code(s): Z95.5 - PRESENCE OF CORONARY ANGIOPLASTY IMPLANT AND GRAFT





(7) Hypertension


Code(s): I10 - ESSENTIAL (PRIMARY) HYPERTENSION   Qualifiers: 


     Hypertension type: essential hypertension        Qualified Code(s): I10 - 

Essential (primary) hypertension; I10 - Essential (primary) hypertension; I10 - 

Essential (primary) hypertension  





(8) Hyperlipidemia


Code(s): E78.5 - HYPERLIPIDEMIA, UNSPECIFIED   Qualifiers: 


     Hyperlipidemia type: pure hypercholesterolemia        Qualified Code(s): 

E78.00 - Pure hypercholesterolemia, unspecified; E78.00 - Pure 

hypercholesterolemia, unspecified; E78.00 - Pure hypercholesterolemia, 

unspecified; E78.0 - Pure hypercholesterolemia  








Assessment/Plan


1. Upper GI bleed referable to bleeding NSAID-induced PUD post epi injection


2. CAD s/p MI, ZAC 2012


3. Hypertension


4. Hyperlipidemia


5. Acute anemia referable to GI loss





P:1. Transfuse to maintain Hg>8.0, continue Protonix, f/u biopsy results, 

advance diet per GI, avoidance of NSAIDs


2. Resume Plavix 75 qd once hemostasis assured by GI, continue Bystolic 10 qhs, 

lisinopril 10 qd, Lipitor 80 qhs


3. Thank you for consultative opportunity

## 2017-10-23 NOTE — PN
Progress Note, Physician


History of Present Illness: 


No events, Comfortable. Hgb between 7 and 8 over the last 48 hrs.








- Current Medication List


Current Medications: 


Active Medications





Atorvastatin Calcium (Lipitor -)  80 mg PO HS [facility]


   Last Admin: 10/22/17 21:20 Dose:  80 mg


Lisinopril (Prinivil)  10 mg PO DAILY [facility]


   Last Admin: 10/23/17 10:04 Dose:  10 mg


Nebivolol (Bystolic -)  10 mg PO DAILY [facility]


   Last Admin: 10/22/17 10:25 Dose:  10 mg


Pantoprazole Sodium (Protonix -)  40 mg PO BID [facility]


   Last Admin: 10/23/17 10:04 Dose:  40 mg











- Objective


Vital Signs: 


 Vital Signs











Temperature  98.8 F   10/23/17 08:00


 


Pulse Rate  60   10/23/17 08:00


 


Respiratory Rate  18   10/23/17 08:00


 


Blood Pressure  126/65   10/23/17 08:00


 


O2 Sat by Pulse Oximetry (%)  96   10/22/17 20:54











Constitutional: Yes: No Distress, Calm


Eyes: Yes: Conjunctiva Clear


HENT: Yes: Atraumatic


Neck: Yes: Supple


Cardiovascular: Yes: Regular Rate and Rhythm


Respiratory: Yes: Regular


Gastrointestinal: Yes: Normal Bowel Sounds, Soft.  No: Tenderness


Neurological: Yes: Alert, Oriented


Labs: 


 CBC, BMP





 10/23/17 07:35 





 10/23/17 07:35 





 INR, PTT











INR  1.06  (0.82-1.09)   10/20/17  09:00    








 CBCD











WBC  6.9 K/mm3 (4.0-10.0)   10/23/17  07:35    


 


RBC  2.18 M/mm3 (4.00-5.60)  L  10/23/17  07:35    


 


Hgb  7.4 GM/dL (11.7-16.9)  L  10/23/17  07:35    


 


Hct  21.0 % (35.4-49)  L  10/23/17  07:35    


 


MCV  96.0 fl (80-96)   10/23/17  07:35    


 


MCHC  35.4 g/dl (32.0-35.9)   10/23/17  07:35    


 


RDW  12.9 % (11.9-15.9)   10/23/17  07:35    


 


Plt Count  162 K/MM3 (134-434)   10/23/17  07:35    


 


MPV  6.9 fl (7.5-11.1)  L  10/23/17  07:35    








 CMP











Sodium  141 mmol/L (136-145)   10/23/17  07:35    


 


Potassium  4.0 mmol/L (3.5-5.1)   10/23/17  07:35    


 


Chloride  109 mmol/L ()  H  10/23/17  07:35    


 


Carbon Dioxide  26 mmol/L (21-32)   10/23/17  07:35    


 


Anion Gap  6  (8-16)  L  10/23/17  07:35    


 


BUN  10 mg/dL (7-18)  D 10/23/17  07:35    


 


Creatinine  0.8 mg/dL (0.7-1.3)   10/23/17  07:35    


 


Creat Clearance w eGFR  > 60  (>60)   10/23/17  07:35    


 


Calcium  7.7 mg/dL (8.5-10.1)  L  10/23/17  07:35    


 


Total Bilirubin  0.7 mg/dL (0.2-1.0)  D 10/23/17  07:35    


 


AST  19 U/L (15-37)  D 10/23/17  07:35    


 


ALT  28 U/L (12-78)   10/23/17  07:35    


 


Alkaline Phosphatase  35 U/L ()  L  10/23/17  07:35    


 


Total Protein  5.0 g/dl (6.4-8.2)  L  10/23/17  07:35    


 


Albumin  2.8 g/dl (3.4-5.0)  L  10/23/17  07:35    














- ....Imaging


Other: Pending (pathology)





Problem List





- Problems


(1) Gastrointestinal hemorrhage with melena


Code(s): K92.1 - MELENA





(2) NSAID long-term use


Code(s): Z79.1 - LONG TERM (CURRENT) USE OF NON-STEROIDAL NON-INFLAM (NSAID)





(3) Anemia associated with acute blood loss


Code(s): D62 - ACUTE POSTHEMORRHAGIC ANEMIA





(4) Duodenal hemorrhage


Code(s): K92.2 - GASTROINTESTINAL HEMORRHAGE, UNSPECIFIED





(5) Esophagitis determined by endoscopy


Code(s): K20.9 - ESOPHAGITIS, UNSPECIFIED








Assessment/Plan


s/p EGD with control of bleeding


2 5 mm shallow, bulb/1st portion actively oozing from visible vessel ulcers 

found and injected with a total of 12 cc 1:10,000 epi. Complete hemostasis was 

achieved. Resolution clips, despite multiple attempts, could not be deployed 

due to ulcers location.





No events. Asymptomatic in bed. Hgb around 7, no signs of ongoing bleeding.





d/c on PPI and Iron


Follow biopsies and Hgb in office in 2 weeks


Discussed with the patent

## 2017-10-24 VITALS — SYSTOLIC BLOOD PRESSURE: 145 MMHG | TEMPERATURE: 98.3 F | DIASTOLIC BLOOD PRESSURE: 70 MMHG | HEART RATE: 78 BPM

## 2017-10-24 LAB
DEPRECATED RDW RBC AUTO: 14.7 % (ref 11.9–15.9)
MCH RBC QN AUTO: 33.3 PG (ref 25.7–33.7)
MCHC RBC AUTO-ENTMCNC: 34.8 G/DL (ref 32–35.9)
MCV RBC: 95.5 FL (ref 80–96)
PLATELET # BLD AUTO: 181 K/MM3 (ref 134–434)
PMV BLD: 6.5 FL (ref 7.5–11.1)
WBC # BLD AUTO: 6.6 K/MM3 (ref 4–10)

## 2017-10-24 RX ADMIN — NEBIVOLOL HYDROCHLORIDE SCH MG: 10 TABLET ORAL at 11:25

## 2017-10-24 RX ADMIN — PANTOPRAZOLE SODIUM SCH MG: 40 TABLET, DELAYED RELEASE ORAL at 09:16

## 2017-10-24 RX ADMIN — LISINOPRIL SCH MG: 10 TABLET ORAL at 11:25

## 2017-10-24 NOTE — DS
Physical Exam: 


SUBJECTIVE: Patient seen and examined








OBJECTIVE:





 Vital Signs











 Period  Temp  Pulse  Resp  BP Sys/Hinds  Pulse Ox


 


 Last 24 Hr  98.2 F-99.1 F  54-61  18-20  /57-62  96








PHYSICAL EXAM





GENERAL: The patient is awake, alert, and fully oriented, in no acute distress.


HEAD: Normal with no signs of trauma.


EYES: PERRL, extraocular movements intact, sclera anicteric, conjunctiva clear. 


ENT: Ears normal, nares patent, oropharynx clear without exudates, moist mucous 

membranes.


NECK: Trachea midline, full range of motion, supple. 


LUNGS: Breath sounds equal, clear to auscultation bilaterally, no wheezes, no 

crackles, no accessory muscle use. 


HEART: Regular rate and rhythm, S1, S2 without murmur, rub or gallop.


ABDOMEN: Soft, nontender, nondistended, normoactive bowel sounds, no guarding, 

no rebound, no hepatosplenomegaly, no masses.


EXTREMITIES: 2+ pulses, warm, well-perfused, no edema. 


NEUROLOGICAL: Cranial nerves II through XII grossly intact. Normal speech, gait 

not observed.


PSYCH: Normal mood, normal affect.


SKIN: Warm, dry, normal turgor, no rashes or lesions noted.





LABS


 Laboratory Results - last 24 hr











  10/23/17 10/23/17 10/24/17





  12:15 19:45 05:40


 


WBC   6.3  6.6


 


RBC   2.39 L  2.46 L


 


Hgb   7.9 L  8.2 L


 


Hct   22.9 L  23.5 L


 


MCV   95.8  95.5


 


MCH   33.1  33.3


 


MCHC   34.5  34.8


 


RDW   14.1  14.7


 


Plt Count   194  181


 


MPV   7.1 L  6.5 L


 


Neutrophils %   56.3 


 


Lymphocytes %   29.4 


 


Monocytes %   9.6 


 


Eosinophils %   4.0 


 


Basophils %   0.7 


 


Blood Type  O POSITIVE  


 


Antibody Screen  Negative  


 


Crossmatch  See Detail  











HOSPITAL COURSE:





Date of Admission:10/20/17





Date of Discharge: 10/24/17














Discharge Summary


Reason For Visit: UPPER GI BLEED


Current Active Problems





Anemia associated with acute blood loss (Acute) 


Coronary artery disease (Acute) 


Duodenal hemorrhage (Acute) 


Esophagitis determined by endoscopy (Acute) 


Gastrointestinal hemorrhage with melena (Acute) 


Hyperlipidemia (Acute) 


Hypertension (Acute) 


NSAID long-term use (Acute) 


Status post coronary artery stent placement (Acute) 


Upper GI bleed (Acute) 








Condition: Stable





- Instructions


Diet, Activity, Other Instructions: 


Mr. Kerr:  





You were admitted to [facility] on 10/20/2017 with an upper GI bleed.  You 

underwent an EGD with repair of this bleed.  You received a unit of blood on 10/

23/2017 with good effect.  Please have your blood work repeated to ensure that  

you remain stable.   Please see your PCP within 1 week for repeat labs.  Please 

see Dr. Nye (Gastroenterology) in 2 weeks for followup and review of the 

biopsies.  You should also see Dr. Rivas (cardiologist) within 1 week after 

discharge from the hospital. 





Please DO NOT resume your Plavix or Aspirin unless you are cleared to do so by 

Dr. Nye. 








Signs/Symptoms to look out for:





- worsening weakness


- shortness of breath


- dizziness with ambulation


- feeling lightheaded


- vomiting up coffee ground emesis





If you experience any of the above, please come back to the ER. 





NEW MEDICATIONS:





PROTONIX 40MG, take this medication twice per day.  Take in the morning and the 

evening.  Take on an empty stomach, 1 hour before meals.





Ferrous Sulfate (iron pill) 325mg daily, take this separate from the Protonix, 

2 hours after Protonix.  Please take the IRON WITH FOOD.





**Please do not take any NSAIDS (Aleve, Aspirin, Ibuprophen).  





Your hemaglobin and hematocrit today is 8.2/23.5.  





Please call me with any questions you may have





Silvina Cuevasaileen Alma NP


[phone]


[facility]





Referrals: 


Mamie Harris MD [Primary Care Provider] - 


Maykel Marshall MD [Staff Physician] - 2 Weeks


Pk Rivas MD [Staff Physician] - 


Disposition: HOME





- Home Medications


Comprehensive Discharge Medication List: 


Ambulatory Orders





Atorvastatin Ca [Lipitor] 80 mg PO HS 04/22/12 


Lisinopril [Prinivil] 10 mg PO DAILY 06/01/12 


Nebivolol [Bystolic -] 10 mg PO HS 07/01/12 


Antiox #11/Om3/Dha/Epa/Lut/Yun [[facility] Adult 50+ Softgel] 1 each PO 10/20/

17 


Atorvastatin Ca [Lipitor] 80 mg PO HS  tablet 10/24/17 


Ferrous Sulfate [Feosol] 325 mg PO DAILY #30 tab 10/24/17 


Lisinopril [Prinivil] 10 mg PO DAILY  tablet 10/24/17 


Nebivolol [Bystolic -] 10 mg PO DAILY  tab 10/24/17 


Pantoprazole Sodium [Protonix -] 40 mg PO BID #60 tab 10/24/17 











- Discharge Referral


Referred to [facility] No

## 2017-10-24 NOTE — PN
Progress Note, Physician





- Current Medication List


Current Medications: 


Active Medications





Atorvastatin Calcium (Lipitor -)  80 mg PO [facility]


   Last Admin: 10/23/17 21:17 Dose:  80 mg


Ferrous Sulfate (Feosol -)  325 mg PO DAILY [facility]


   Last Admin: 10/24/17 09:16 Dose:  325 mg


Lisinopril (Prinivil)  10 mg PO DAILY [facility]


   Last Admin: 10/23/17 10:04 Dose:  10 mg


Nebivolol (Bystolic -)  10 mg PO DAILY [facility]


   Last Admin: 10/23/17 12:16 Dose:  10 mg


Pantoprazole Sodium (Protonix -)  40 mg PO BID [facility]


   Last Admin: 10/24/17 09:16 Dose:  40 mg











- Objective


Vital Signs: 


 Vital Signs











Temperature  98.2 F   10/24/17 05:30


 


Pulse Rate  60   10/24/17 05:30


 


Respiratory Rate  20   10/24/17 05:30


 


Blood Pressure  110/60   10/24/17 05:30


 


O2 Sat by Pulse Oximetry (%)  96   10/23/17 20:36











Labs: 


 CBC, BMP





 10/24/17 05:40 





 10/23/17 07:35 





 INR, PTT











INR  1.06  (0.82-1.09)   10/20/17  09:00

## 2017-10-24 NOTE — PATH
Surgical Pathology Report



Patient Name:  IAN ORELLANA

Accession #:  [id number]

Med. Rec. #:  [id number]                                                        

   /Age/Gender:  1955 (Age: 62) / M

Account:  [id number]                                                          

             Location: [address] [place] [address]

Taken:  10/20/2017

Received:  10/20/2017

Reported:  10/24/2017

Physicians:  Maykel Marshall M.D.

  



Specimen(s) Received

A: BX ANTRUM AND BODY 

B: BX DISTAL ESOPHAGUS 





Clinical History

GI bleed

Rule out H. Pylori







Final Diagnosis

A. STOMACH, ANTRUM AND BODY, BIOPSY:  

GASTRIC ANTRAL AND FUNDIC MUCOSA WITH NO PATHOLOGIC CHANGES.

IMMUNOSTAIN FOR H. PYLORI IS NEGATIVE.  



B. DISTAL ESOPHAGUS, BIOPSY:  

SQUAMOUS AND GASTRIC MUCOSA WITH CHRONIC INFLAMMATION.

NO INTESTINAL METAPLASIA IDENTIFIED (NO GARCIA'S IDENTIFIED). 







***Electronically Signed***

Judah Oleary M.D.





Gross Description

A.  Received in formalin, labeled "biopsy antrum and body" are 3 tan, irregular

portions of soft tissue ranging from 0.3-0.5 cm. in greatest dimension. The

specimens are submitted in toto in one cassette.



B.  Received in formalin, labeled "distal esophagus" are 2 tan, irregular

portions of soft tissue averaging 0.1 cm. in greatest dimension. The specimens

are submitted in toto in one cassette.

/10/23/2017



[facility]/10/23/2017

## 2021-12-30 ENCOUNTER — HOSPITAL ENCOUNTER (EMERGENCY)
Dept: HOSPITAL 74 - JER | Age: 66
Discharge: HOME | End: 2021-12-30
Payer: COMMERCIAL

## 2021-12-30 VITALS — BODY MASS INDEX: 26.9 KG/M2

## 2021-12-30 VITALS — DIASTOLIC BLOOD PRESSURE: 87 MMHG | HEART RATE: 74 BPM | SYSTOLIC BLOOD PRESSURE: 152 MMHG

## 2021-12-30 VITALS — TEMPERATURE: 98.3 F

## 2021-12-30 DIAGNOSIS — R06.02: Primary | ICD-10-CM

## 2021-12-30 LAB
ALBUMIN SERPL-MCNC: 4.1 G/DL (ref 3.4–5)
ALP SERPL-CCNC: 73 U/L (ref 45–117)
ALT SERPL-CCNC: 49 U/L (ref 13–61)
ANION GAP SERPL CALC-SCNC: 6 MMOL/L (ref 8–16)
APTT BLD: 25.8 SECONDS (ref 25.2–36.5)
AST SERPL-CCNC: 87 U/L (ref 15–37)
BILIRUB SERPL-MCNC: 0.6 MG/DL (ref 0.2–1)
BUN SERPL-MCNC: 18.7 MG/DL (ref 7–18)
CALCIUM SERPL-MCNC: 9.3 MG/DL (ref 8.5–10.1)
CHLORIDE SERPL-SCNC: 106 MMOL/L (ref 98–107)
CO2 SERPL-SCNC: 23 MMOL/L (ref 21–32)
CREAT SERPL-MCNC: 1 MG/DL (ref 0.55–1.3)
DEPRECATED RDW RBC AUTO: 13.2 % (ref 11.9–15.9)
GLUCOSE SERPL-MCNC: 93 MG/DL (ref 74–106)
HCT VFR BLD CALC: 38.1 % (ref 35.4–49)
HGB BLD-MCNC: 12.8 GM/DL (ref 11.7–16.9)
INR BLD: 0.97 (ref 0.83–1.09)
MCH RBC QN AUTO: 33.1 PG (ref 25.7–33.7)
MCHC RBC AUTO-ENTMCNC: 33.7 G/DL (ref 32–35.9)
MCV RBC: 98.4 FL (ref 80–96)
PLATELET # BLD AUTO: 278 10^3/UL (ref 134–434)
PMV BLD: 6.8 FL (ref 7.5–11.1)
PROT SERPL-MCNC: 7.6 G/DL (ref 6.4–8.2)
PT PNL PPP: 11.4 SEC (ref 9.7–13)
RBC # BLD AUTO: 3.87 M/MM3 (ref 4–5.6)
SODIUM SERPL-SCNC: 135 MMOL/L (ref 136–145)
WBC # BLD AUTO: 9.5 K/MM3 (ref 4–10)

## 2021-12-30 PROCEDURE — U0005 INFEC AGEN DETEC AMPLI PROBE: HCPCS

## 2021-12-30 PROCEDURE — U0003 INFECTIOUS AGENT DETECTION BY NUCLEIC ACID (DNA OR RNA); SEVERE ACUTE RESPIRATORY SYNDROME CORONAVIRUS 2 (SARS-COV-2) (CORONAVIRUS DISEASE [COVID-19]), AMPLIFIED PROBE TECHNIQUE, MAKING USE OF HIGH THROUGHPUT TECHNOLOGIES AS DESCRIBED BY CMS-2020-01-R: HCPCS

## 2021-12-30 PROCEDURE — C9803 HOPD COVID-19 SPEC COLLECT: HCPCS

## 2022-07-21 ENCOUNTER — HOSPITAL ENCOUNTER (INPATIENT)
Dept: HOSPITAL 74 - FER | Age: 67
LOS: 12 days | Discharge: ANOTHER HOSPITAL | DRG: 280 | End: 2022-08-02
Attending: GENERAL ACUTE CARE HOSPITAL | Admitting: STUDENT IN AN ORGANIZED HEALTH CARE EDUCATION/TRAINING PROGRAM
Payer: COMMERCIAL

## 2022-07-21 VITALS — BODY MASS INDEX: 25.3 KG/M2

## 2022-07-21 DIAGNOSIS — E51.2: ICD-10-CM

## 2022-07-21 DIAGNOSIS — Z86.718: ICD-10-CM

## 2022-07-21 DIAGNOSIS — K64.8: ICD-10-CM

## 2022-07-21 DIAGNOSIS — I21.A1: Primary | ICD-10-CM

## 2022-07-21 DIAGNOSIS — I63.89: ICD-10-CM

## 2022-07-21 DIAGNOSIS — R41.82: ICD-10-CM

## 2022-07-21 DIAGNOSIS — I25.10: ICD-10-CM

## 2022-07-21 DIAGNOSIS — K83.1: ICD-10-CM

## 2022-07-21 DIAGNOSIS — Z95.5: ICD-10-CM

## 2022-07-21 DIAGNOSIS — K44.9: ICD-10-CM

## 2022-07-21 DIAGNOSIS — K57.30: ICD-10-CM

## 2022-07-21 DIAGNOSIS — D50.0: ICD-10-CM

## 2022-07-21 DIAGNOSIS — R74.8: ICD-10-CM

## 2022-07-21 DIAGNOSIS — C78.6: ICD-10-CM

## 2022-07-21 DIAGNOSIS — I48.91: ICD-10-CM

## 2022-07-21 DIAGNOSIS — E78.5: ICD-10-CM

## 2022-07-21 DIAGNOSIS — I10: ICD-10-CM

## 2022-07-21 DIAGNOSIS — F10.20: ICD-10-CM

## 2022-07-21 DIAGNOSIS — C78.89: ICD-10-CM

## 2022-07-21 DIAGNOSIS — K21.9: ICD-10-CM

## 2022-07-21 DIAGNOSIS — C78.7: ICD-10-CM

## 2022-07-21 DIAGNOSIS — I16.0: ICD-10-CM

## 2022-07-21 DIAGNOSIS — Z87.11: ICD-10-CM

## 2022-07-21 DIAGNOSIS — I08.0: ICD-10-CM

## 2022-07-21 DIAGNOSIS — J32.2: ICD-10-CM

## 2022-07-21 LAB
ALBUMIN SERPL-MCNC: 3.7 G/DL (ref 3.4–5)
ALP SERPL-CCNC: 268 U/L (ref 45–117)
ALT SERPL-CCNC: 59 U/L (ref 13–61)
ANION GAP SERPL CALC-SCNC: 9 MMOL/L (ref 8–16)
AST SERPL-CCNC: 64 U/L (ref 15–37)
BILIRUB SERPL-MCNC: 0.8 MG/DL (ref 0.2–1)
BUN SERPL-MCNC: 16 MG/DL (ref 7–18)
CALCIUM SERPL-MCNC: 9.7 MG/DL (ref 8.5–10)
CHLORIDE SERPL-SCNC: 101 MMOL/L (ref 98–107)
CO2 SERPL-SCNC: 23 MMOL/L (ref 21–32)
CREAT SERPL-MCNC: 1 MG/DL (ref 0.55–1.3)
DEPRECATED RDW RBC AUTO: 13.9 % (ref 11.9–15.9)
EPITH CASTS URNS QL MICRO: (no result) /HPF
GLUCOSE SERPL-MCNC: 135 MG/DL (ref 74–106)
HCT VFR BLD CALC: 39.1 % (ref 35.4–49)
HGB BLD-MCNC: 13.8 G/DL (ref 11.7–16.9)
INR BLD: 1.84 (ref 0.83–1.09)
MCH RBC QN AUTO: 33.8 PG (ref 25.7–33.7)
MCHC RBC AUTO-ENTMCNC: 35.2 G/DL (ref 32–35.9)
MCV RBC: 96 FL (ref 80–96)
PLATELET # BLD AUTO: 144.3 10^3/UL (ref 134–434)
PMV BLD: 7.4 FL (ref 7.5–11.1)
PROT SERPL-MCNC: 7.3 G/DL (ref 6.4–8.2)
PT PNL PPP: 21.3 SEC (ref 9.7–13)
RBC # BLD AUTO: 4.07 10^6/UL (ref 4–5.6)
SODIUM SERPL-SCNC: 133 MMOL/L (ref 136–145)
WBC # BLD AUTO: 13 10^3/UL (ref 4–10.8)

## 2022-07-21 PROCEDURE — G0378 HOSPITAL OBSERVATION PER HR: HCPCS

## 2022-07-21 PROCEDURE — A9502 TC99M TETROFOSMIN: HCPCS

## 2022-07-21 PROCEDURE — U0005 INFEC AGEN DETEC AMPLI PROBE: HCPCS

## 2022-07-21 PROCEDURE — U0003 INFECTIOUS AGENT DETECTION BY NUCLEIC ACID (DNA OR RNA); SEVERE ACUTE RESPIRATORY SYNDROME CORONAVIRUS 2 (SARS-COV-2) (CORONAVIRUS DISEASE [COVID-19]), AMPLIFIED PROBE TECHNIQUE, MAKING USE OF HIGH THROUGHPUT TECHNOLOGIES AS DESCRIBED BY CMS-2020-01-R: HCPCS

## 2022-07-21 RX ADMIN — NEBIVOLOL HYDROCHLORIDE SCH MG: 10 TABLET ORAL at 22:48

## 2022-07-22 LAB
ALBUMIN SERPL-MCNC: 3.3 G/DL (ref 3.4–5)
ALP SERPL-CCNC: 268 U/L (ref 45–117)
ALT SERPL-CCNC: 53 U/L (ref 13–61)
AMPHET UR-MCNC: NEGATIVE NG/ML
ANION GAP SERPL CALC-SCNC: 10 MMOL/L (ref 8–16)
AST SERPL-CCNC: 59 U/L (ref 15–37)
BARBITURATES UR-MCNC: NEGATIVE UG/ML
BASOPHILS # BLD: 0.8 % (ref 0–2)
BENZODIAZ UR SCN-MCNC: NEGATIVE NG/ML
BILIRUB SERPL-MCNC: 0.8 MG/DL (ref 0.2–1)
BUN SERPL-MCNC: 16 MG/DL (ref 7–18)
CALCIUM SERPL-MCNC: 9.5 MG/DL (ref 8.5–10)
CHLORIDE SERPL-SCNC: 99 MMOL/L (ref 98–107)
CHOLEST SERPL-MCNC: 167 MG/DL (ref 50–200)
CO2 SERPL-SCNC: 25 MMOL/L (ref 21–32)
COCAINE UR-MCNC: NEGATIVE NG/ML
CREAT SERPL-MCNC: 1 MG/DL (ref 0.55–1.3)
DEPRECATED RDW RBC AUTO: 13.3 % (ref 11.9–15.9)
EOSINOPHIL # BLD: 3.6 % (ref 0–4.5)
GLUCOSE SERPL-MCNC: 132 MG/DL (ref 74–106)
HCT VFR BLD CALC: 37.6 % (ref 35.4–49)
HDLC SERPL-MCNC: 47 MG/DL (ref 40–60)
HGB BLD-MCNC: 12.7 GM/DL (ref 11.7–16.9)
LDLC SERPL CALC-MCNC: 101 MG/DL (ref 5–100)
LYMPHOCYTES # BLD: 15.2 % (ref 8–40)
MCH RBC QN AUTO: 32.1 PG (ref 25.7–33.7)
MCHC RBC AUTO-ENTMCNC: 33.8 G/DL (ref 32–35.9)
MCV RBC: 95 FL (ref 80–96)
METHADONE UR-MCNC: NEGATIVE UG/L
MONOCYTES # BLD AUTO: 10.2 % (ref 3.8–10.2)
NEUTROPHILS # BLD: 70.2 % (ref 42.8–82.8)
OPIATES UR QL SCN: NEGATIVE
PCP UR QL SCN: NEGATIVE
PLATELET # BLD AUTO: 135 10^3/UL (ref 134–434)
PMV BLD: 7.5 FL (ref 7.5–11.1)
PROT SERPL-MCNC: 6.5 G/DL (ref 6.4–8.2)
RBC # BLD AUTO: 3.95 M/MM3 (ref 4–5.6)
SODIUM SERPL-SCNC: 134 MMOL/L (ref 136–145)
TRIGL SERPL-MCNC: 96 MG/DL (ref 0–150)
WBC # BLD AUTO: 12.2 K/MM3 (ref 4–10)

## 2022-07-22 RX ADMIN — THIAMINE HYDROCHLORIDE SCH MG: 100 INJECTION, SOLUTION INTRAMUSCULAR; INTRAVENOUS at 20:59

## 2022-07-22 RX ADMIN — FAMOTIDINE SCH MG: 20 TABLET ORAL at 10:35

## 2022-07-22 RX ADMIN — ATORVASTATIN CALCIUM SCH: 40 TABLET, FILM COATED ORAL at 22:17

## 2022-07-22 RX ADMIN — NEBIVOLOL HYDROCHLORIDE SCH MG: 10 TABLET ORAL at 10:35

## 2022-07-23 LAB
ALBUMIN SERPL-MCNC: 3.4 G/DL (ref 3.4–5)
ALP SERPL-CCNC: 318 U/L (ref 45–117)
ALT SERPL-CCNC: 64 U/L (ref 13–61)
ANION GAP SERPL CALC-SCNC: 10 MMOL/L (ref 8–16)
APTT BLD: 26.8 SECONDS (ref 25.2–36.5)
AST SERPL-CCNC: 62 U/L (ref 15–37)
BILIRUB SERPL-MCNC: 0.9 MG/DL (ref 0.2–1)
BUN SERPL-MCNC: 17.5 MG/DL (ref 7–18)
CALCIUM SERPL-MCNC: 9.6 MG/DL (ref 8.5–10.1)
CHLORIDE SERPL-SCNC: 100 MMOL/L (ref 98–107)
CO2 SERPL-SCNC: 27 MMOL/L (ref 21–32)
CREAT SERPL-MCNC: 1.1 MG/DL (ref 0.55–1.3)
DEPRECATED RDW RBC AUTO: 13.1 % (ref 11.9–15.9)
GLUCOSE SERPL-MCNC: 118 MG/DL (ref 74–106)
HCT VFR BLD CALC: 39 % (ref 35.4–49)
HGB BLD-MCNC: 13.1 GM/DL (ref 11.7–16.9)
INR BLD: 1.34 (ref 0.83–1.09)
MAGNESIUM SERPL-MCNC: 1.8 MG/DL (ref 1.8–2.4)
MCH RBC QN AUTO: 32.3 PG (ref 25.7–33.7)
MCHC RBC AUTO-ENTMCNC: 33.6 G/DL (ref 32–35.9)
MCV RBC: 96 FL (ref 80–96)
PHOSPHATE SERPL-MCNC: 3.8 MG/DL (ref 2.5–4.9)
PLATELET # BLD AUTO: 145 10^3/UL (ref 134–434)
PMV BLD: 8 FL (ref 7.5–11.1)
PROT SERPL-MCNC: 7.2 G/DL (ref 6.4–8.2)
PT PNL PPP: 15.4 SEC (ref 9.7–13)
RBC # BLD AUTO: 4.07 M/MM3 (ref 4–5.6)
SODIUM SERPL-SCNC: 137 MMOL/L (ref 136–145)
WBC # BLD AUTO: 10.9 K/MM3 (ref 4–10)

## 2022-07-23 RX ADMIN — ATORVASTATIN CALCIUM SCH MG: 40 TABLET, FILM COATED ORAL at 21:12

## 2022-07-23 RX ADMIN — THIAMINE HYDROCHLORIDE SCH MG: 100 INJECTION, SOLUTION INTRAMUSCULAR; INTRAVENOUS at 06:00

## 2022-07-23 RX ADMIN — ASPIRIN 81 MG SCH MG: 81 TABLET ORAL at 10:47

## 2022-07-23 RX ADMIN — THIAMINE HYDROCHLORIDE SCH MG: 100 INJECTION, SOLUTION INTRAMUSCULAR; INTRAVENOUS at 13:28

## 2022-07-23 RX ADMIN — THIAMINE HYDROCHLORIDE SCH MG: 100 INJECTION, SOLUTION INTRAMUSCULAR; INTRAVENOUS at 21:12

## 2022-07-23 RX ADMIN — FAMOTIDINE SCH MG: 20 TABLET ORAL at 10:47

## 2022-07-24 RX ADMIN — THIAMINE HYDROCHLORIDE SCH MG: 100 INJECTION, SOLUTION INTRAMUSCULAR; INTRAVENOUS at 21:19

## 2022-07-24 RX ADMIN — FAMOTIDINE SCH MG: 20 TABLET ORAL at 10:26

## 2022-07-24 RX ADMIN — THIAMINE HYDROCHLORIDE SCH MG: 100 INJECTION, SOLUTION INTRAMUSCULAR; INTRAVENOUS at 13:01

## 2022-07-24 RX ADMIN — ATORVASTATIN CALCIUM SCH MG: 40 TABLET, FILM COATED ORAL at 21:18

## 2022-07-24 RX ADMIN — ASPIRIN 81 MG SCH MG: 81 TABLET ORAL at 10:26

## 2022-07-24 RX ADMIN — THIAMINE HYDROCHLORIDE SCH MG: 100 INJECTION, SOLUTION INTRAMUSCULAR; INTRAVENOUS at 06:00

## 2022-07-25 LAB
ALBUMIN SERPL-MCNC: 3.4 G/DL (ref 3.4–5)
ALP SERPL-CCNC: 386 U/L (ref 45–117)
ALT SERPL-CCNC: 86 U/L (ref 13–61)
ANION GAP SERPL CALC-SCNC: 10 MMOL/L (ref 8–16)
AST SERPL-CCNC: 75 U/L (ref 15–37)
BASOPHILS # BLD: 0.8 % (ref 0–2)
BILIRUB SERPL-MCNC: 0.5 MG/DL (ref 0.2–1)
BUN SERPL-MCNC: 14.8 MG/DL (ref 7–18)
CALCIUM SERPL-MCNC: 9.4 MG/DL (ref 8.5–10.1)
CHLORIDE SERPL-SCNC: 102 MMOL/L (ref 98–107)
CO2 SERPL-SCNC: 27 MMOL/L (ref 21–32)
CREAT SERPL-MCNC: 1 MG/DL (ref 0.55–1.3)
DEPRECATED RDW RBC AUTO: 13.1 % (ref 11.9–15.9)
EOSINOPHIL # BLD: 3.7 % (ref 0–4.5)
GLUCOSE SERPL-MCNC: 127 MG/DL (ref 74–106)
HCT VFR BLD CALC: 38.9 % (ref 35.4–49)
HGB BLD-MCNC: 12.9 GM/DL (ref 11.7–16.9)
LYMPHOCYTES # BLD: 17.4 % (ref 8–40)
MAGNESIUM SERPL-MCNC: 1.8 MG/DL (ref 1.8–2.4)
MCH RBC QN AUTO: 31.6 PG (ref 25.7–33.7)
MCHC RBC AUTO-ENTMCNC: 33.3 G/DL (ref 32–35.9)
MCV RBC: 94.9 FL (ref 80–96)
MONOCYTES # BLD AUTO: 7.7 % (ref 3.8–10.2)
NEUTROPHILS # BLD: 70.4 % (ref 42.8–82.8)
PHOSPHATE SERPL-MCNC: 3.6 MG/DL (ref 2.5–4.9)
PLATELET # BLD AUTO: 165 10^3/UL (ref 134–434)
PMV BLD: 8 FL (ref 7.5–11.1)
PROT SERPL-MCNC: 7.2 G/DL (ref 6.4–8.2)
RBC # BLD AUTO: 4.1 M/MM3 (ref 4–5.6)
SODIUM SERPL-SCNC: 139 MMOL/L (ref 136–145)
WBC # BLD AUTO: 15 K/MM3 (ref 4–10)

## 2022-07-25 RX ADMIN — THIAMINE HYDROCHLORIDE SCH MG: 100 INJECTION, SOLUTION INTRAMUSCULAR; INTRAVENOUS at 13:12

## 2022-07-25 RX ADMIN — ASPIRIN 81 MG SCH MG: 81 TABLET ORAL at 13:12

## 2022-07-25 RX ADMIN — ATORVASTATIN CALCIUM SCH MG: 80 TABLET, FILM COATED ORAL at 21:41

## 2022-07-25 RX ADMIN — NEBIVOLOL HYDROCHLORIDE SCH MG: 10 TABLET ORAL at 13:12

## 2022-07-25 RX ADMIN — FAMOTIDINE SCH MG: 20 TABLET ORAL at 13:12

## 2022-07-25 RX ADMIN — PANTOPRAZOLE SODIUM SCH MG: 40 TABLET, DELAYED RELEASE ORAL at 22:22

## 2022-07-25 RX ADMIN — THIAMINE HYDROCHLORIDE SCH MG: 100 INJECTION, SOLUTION INTRAMUSCULAR; INTRAVENOUS at 06:01

## 2022-07-25 RX ADMIN — THIAMINE HYDROCHLORIDE SCH MG: 100 INJECTION, SOLUTION INTRAMUSCULAR; INTRAVENOUS at 20:03

## 2022-07-26 LAB
ALBUMIN SERPL-MCNC: 3.2 G/DL (ref 3.4–5)
ALBUMIN SERPL-MCNC: 3.2 G/DL (ref 3.4–5)
ALP SERPL-CCNC: 404 U/L (ref 45–117)
ALP SERPL-CCNC: 408 U/L (ref 45–117)
ALT SERPL-CCNC: 85 U/L (ref 13–61)
ALT SERPL-CCNC: 87 U/L (ref 13–61)
ANION GAP SERPL CALC-SCNC: 9 MMOL/L (ref 8–16)
AST SERPL-CCNC: 75 U/L (ref 15–37)
AST SERPL-CCNC: 78 U/L (ref 15–37)
BASOPHILS # BLD: 0.8 % (ref 0–2)
BILIRUB CONJ SERPL-MCNC: 0.2 MG/DL (ref 0–0.2)
BILIRUB SERPL-MCNC: 0.4 MG/DL (ref 0.2–1)
BILIRUB SERPL-MCNC: 0.4 MG/DL (ref 0.2–1)
BUN SERPL-MCNC: 16.4 MG/DL (ref 7–18)
CALCIUM SERPL-MCNC: 8.7 MG/DL (ref 8.5–10.1)
CHLORIDE SERPL-SCNC: 104 MMOL/L (ref 98–107)
CO2 SERPL-SCNC: 26 MMOL/L (ref 21–32)
CREAT SERPL-MCNC: 1.1 MG/DL (ref 0.55–1.3)
DEPRECATED RDW RBC AUTO: 12.9 % (ref 11.9–15.9)
EOSINOPHIL # BLD: 3.5 % (ref 0–4.5)
GLUCOSE SERPL-MCNC: 129 MG/DL (ref 74–106)
HCT VFR BLD CALC: 35.6 % (ref 35.4–49)
HGB BLD-MCNC: 12.1 GM/DL (ref 11.7–16.9)
IRON SERPL-MCNC: 30 UG/DL (ref 50–175)
LYMPHOCYTES # BLD: 14.4 % (ref 8–40)
MAGNESIUM SERPL-MCNC: 1.8 MG/DL (ref 1.8–2.4)
MCH RBC QN AUTO: 32.1 PG (ref 25.7–33.7)
MCHC RBC AUTO-ENTMCNC: 34 G/DL (ref 32–35.9)
MCV RBC: 94.5 FL (ref 80–96)
MONOCYTES # BLD AUTO: 7.9 % (ref 3.8–10.2)
NEUTROPHILS # BLD: 73.4 % (ref 42.8–82.8)
PHOSPHATE SERPL-MCNC: 3.7 MG/DL (ref 2.5–4.9)
PLATELET # BLD AUTO: 141 10^3/UL (ref 134–434)
PMV BLD: 7.9 FL (ref 7.5–11.1)
PROT SERPL-MCNC: 6.6 G/DL (ref 6.4–8.2)
PROT SERPL-MCNC: 6.7 G/DL (ref 6.4–8.2)
RBC # BLD AUTO: 3.77 M/MM3 (ref 4–5.6)
SODIUM SERPL-SCNC: 139 MMOL/L (ref 136–145)
TIBC SERPL-MCNC: 266 UG/DL (ref 250–450)
WBC # BLD AUTO: 13.8 K/MM3 (ref 4–10)

## 2022-07-26 RX ADMIN — THIAMINE HYDROCHLORIDE SCH MG: 100 INJECTION, SOLUTION INTRAMUSCULAR; INTRAVENOUS at 16:21

## 2022-07-26 RX ADMIN — PANTOPRAZOLE SODIUM SCH MG: 40 TABLET, DELAYED RELEASE ORAL at 09:41

## 2022-07-26 RX ADMIN — THIAMINE HYDROCHLORIDE SCH MG: 100 INJECTION, SOLUTION INTRAMUSCULAR; INTRAVENOUS at 22:46

## 2022-07-26 RX ADMIN — THIAMINE HYDROCHLORIDE SCH MG: 100 INJECTION, SOLUTION INTRAMUSCULAR; INTRAVENOUS at 04:59

## 2022-07-26 RX ADMIN — ENOXAPARIN SODIUM SCH MG: 100 INJECTION SUBCUTANEOUS at 19:03

## 2022-07-26 RX ADMIN — PANTOPRAZOLE SODIUM SCH MG: 40 TABLET, DELAYED RELEASE ORAL at 21:02

## 2022-07-26 RX ADMIN — ASPIRIN 81 MG SCH MG: 81 TABLET ORAL at 09:40

## 2022-07-26 RX ADMIN — NEBIVOLOL HYDROCHLORIDE SCH MG: 10 TABLET ORAL at 09:40

## 2022-07-26 RX ADMIN — ATORVASTATIN CALCIUM SCH MG: 80 TABLET, FILM COATED ORAL at 21:02

## 2022-07-27 LAB
ALBUMIN SERPL-MCNC: 3 G/DL (ref 3.4–5)
ALP SERPL-CCNC: 382 U/L (ref 45–117)
ALT SERPL-CCNC: 88 U/L (ref 13–61)
ANION GAP SERPL CALC-SCNC: 10 MMOL/L (ref 8–16)
AST SERPL-CCNC: 75 U/L (ref 15–37)
BASOPHILS # BLD: 0.9 % (ref 0–2)
BILIRUB CONJ SERPL-MCNC: 0.2 MG/DL (ref 0–0.2)
BILIRUB SERPL-MCNC: 0.6 MG/DL (ref 0.2–1)
BUN SERPL-MCNC: 14 MG/DL (ref 7–18)
CALCIUM SERPL-MCNC: 9 MG/DL (ref 8.5–10.1)
CEA SERPL-MCNC: 29 NG/ML (ref 0–4.7)
CHLORIDE SERPL-SCNC: 101 MMOL/L (ref 98–107)
CO2 SERPL-SCNC: 26 MMOL/L (ref 21–32)
CREAT SERPL-MCNC: 1 MG/DL (ref 0.55–1.3)
DEPRECATED RDW RBC AUTO: 13 % (ref 11.9–15.9)
EOSINOPHIL # BLD: 3.6 % (ref 0–4.5)
GLIADIN IGA SER-ACNC: 3 UNITS (ref 0–19)
GLIADIN IGG SER IA-ACNC: 2 UNITS (ref 0–19)
GLUCOSE SERPL-MCNC: 122 MG/DL (ref 74–106)
HCT VFR BLD CALC: 35.1 % (ref 35.4–49)
HGB BLD-MCNC: 11.9 GM/DL (ref 11.7–16.9)
LYMPHOCYTES # BLD: 14.1 % (ref 8–40)
MAGNESIUM SERPL-MCNC: 1.7 MG/DL (ref 1.8–2.4)
MCH RBC QN AUTO: 32.1 PG (ref 25.7–33.7)
MCHC RBC AUTO-ENTMCNC: 33.8 G/DL (ref 32–35.9)
MCV RBC: 94.8 FL (ref 80–96)
MONOCYTES # BLD AUTO: 8.4 % (ref 3.8–10.2)
NEUTROPHILS # BLD: 73 % (ref 42.8–82.8)
PHOSPHATE SERPL-MCNC: 3.7 MG/DL (ref 2.5–4.9)
PLATELET # BLD AUTO: 145 10^3/UL (ref 134–434)
PMV BLD: 8.5 FL (ref 7.5–11.1)
PROT SERPL-MCNC: 6.6 G/DL (ref 6.4–8.2)
RBC # BLD AUTO: 3.7 M/MM3 (ref 4–5.6)
SODIUM SERPL-SCNC: 137 MMOL/L (ref 136–145)
TTG IGG SER QL: < 2 U/ML (ref 0–5)
WBC # BLD AUTO: 12.6 K/MM3 (ref 4–10)

## 2022-07-27 RX ADMIN — THIAMINE HYDROCHLORIDE SCH MG: 100 INJECTION, SOLUTION INTRAMUSCULAR; INTRAVENOUS at 20:22

## 2022-07-27 RX ADMIN — NEBIVOLOL HYDROCHLORIDE SCH MG: 10 TABLET ORAL at 10:01

## 2022-07-27 RX ADMIN — ENOXAPARIN SODIUM SCH MG: 100 INJECTION SUBCUTANEOUS at 06:12

## 2022-07-27 RX ADMIN — REGADENOSON ONE MG: 0.08 INJECTION, SOLUTION INTRAVENOUS at 12:00

## 2022-07-27 RX ADMIN — THIAMINE HYDROCHLORIDE SCH MG: 100 INJECTION, SOLUTION INTRAMUSCULAR; INTRAVENOUS at 05:28

## 2022-07-27 RX ADMIN — THIAMINE HYDROCHLORIDE SCH: 100 INJECTION, SOLUTION INTRAMUSCULAR; INTRAVENOUS at 16:58

## 2022-07-27 RX ADMIN — ATORVASTATIN CALCIUM SCH MG: 80 TABLET, FILM COATED ORAL at 21:03

## 2022-07-27 RX ADMIN — ASPIRIN 81 MG SCH MG: 81 TABLET ORAL at 10:00

## 2022-07-27 RX ADMIN — ENOXAPARIN SODIUM SCH MG: 100 INJECTION SUBCUTANEOUS at 20:22

## 2022-07-27 RX ADMIN — REGADENOSON ONE MG: 0.08 INJECTION, SOLUTION INTRAVENOUS at 12:40

## 2022-07-27 RX ADMIN — PANTOPRAZOLE SODIUM SCH MG: 40 TABLET, DELAYED RELEASE ORAL at 21:03

## 2022-07-27 RX ADMIN — PANTOPRAZOLE SODIUM SCH MG: 40 TABLET, DELAYED RELEASE ORAL at 10:00

## 2022-07-28 LAB
ALBUMIN SERPL-MCNC: 3.2 G/DL (ref 3.4–5)
ALP SERPL-CCNC: 383 U/L (ref 45–117)
ALT SERPL-CCNC: 90 U/L (ref 13–61)
ANION GAP SERPL CALC-SCNC: 8 MMOL/L (ref 8–16)
AST SERPL-CCNC: 75 U/L (ref 15–37)
BASOPHILS # BLD: 0.7 % (ref 0–2)
BILIRUB CONJ SERPL-MCNC: 0.2 MG/DL (ref 0–0.2)
BILIRUB SERPL-MCNC: 0.7 MG/DL (ref 0.2–1)
BUN SERPL-MCNC: 14.5 MG/DL (ref 7–18)
CALCIUM SERPL-MCNC: 9 MG/DL (ref 8.5–10.1)
CHLORIDE SERPL-SCNC: 103 MMOL/L (ref 98–107)
CO2 SERPL-SCNC: 27 MMOL/L (ref 21–32)
CREAT SERPL-MCNC: 1 MG/DL (ref 0.55–1.3)
DEPRECATED RDW RBC AUTO: 13.1 % (ref 11.9–15.9)
EOSINOPHIL # BLD: 4.5 % (ref 0–4.5)
GLUCOSE SERPL-MCNC: 106 MG/DL (ref 74–106)
HCT VFR BLD CALC: 35.5 % (ref 35.4–49)
HGB BLD-MCNC: 11.8 GM/DL (ref 11.7–16.9)
LYMPHOCYTES # BLD: 15.7 % (ref 8–40)
MAGNESIUM SERPL-MCNC: 2.4 MG/DL (ref 1.8–2.4)
MCH RBC QN AUTO: 31.5 PG (ref 25.7–33.7)
MCHC RBC AUTO-ENTMCNC: 33.3 G/DL (ref 32–35.9)
MCV RBC: 94.7 FL (ref 80–96)
MONOCYTES # BLD AUTO: 9.7 % (ref 3.8–10.2)
NEUTROPHILS # BLD: 69.4 % (ref 42.8–82.8)
PHOSPHATE SERPL-MCNC: 4.3 MG/DL (ref 2.5–4.9)
PLATELET # BLD AUTO: 195 10^3/UL (ref 134–434)
PMV BLD: 8.2 FL (ref 7.5–11.1)
PROT SERPL-MCNC: 6.3 G/DL (ref 6.4–8.2)
RBC # BLD AUTO: 3.75 M/MM3 (ref 4–5.6)
SODIUM SERPL-SCNC: 139 MMOL/L (ref 136–145)
WBC # BLD AUTO: 11.4 K/MM3 (ref 4–10)

## 2022-07-28 RX ADMIN — PANTOPRAZOLE SODIUM SCH MG: 40 TABLET, DELAYED RELEASE ORAL at 21:10

## 2022-07-28 RX ADMIN — ENOXAPARIN SODIUM SCH MG: 100 INJECTION SUBCUTANEOUS at 06:11

## 2022-07-28 RX ADMIN — PANTOPRAZOLE SODIUM SCH MG: 40 TABLET, DELAYED RELEASE ORAL at 09:34

## 2022-07-28 RX ADMIN — ATORVASTATIN CALCIUM SCH MG: 80 TABLET, FILM COATED ORAL at 21:10

## 2022-07-28 RX ADMIN — NEBIVOLOL HYDROCHLORIDE SCH: 10 TABLET ORAL at 09:37

## 2022-07-28 RX ADMIN — ASPIRIN 81 MG SCH MG: 81 TABLET ORAL at 09:34

## 2022-07-28 RX ADMIN — POLYETHYLENE GLYCOL 3350 SCH: 17 POWDER, FOR SOLUTION ORAL at 21:10

## 2022-07-28 RX ADMIN — THIAMINE HYDROCHLORIDE SCH MG: 100 INJECTION, SOLUTION INTRAMUSCULAR; INTRAVENOUS at 21:10

## 2022-07-28 RX ADMIN — THIAMINE HYDROCHLORIDE SCH MG: 100 INJECTION, SOLUTION INTRAMUSCULAR; INTRAVENOUS at 04:54

## 2022-07-28 RX ADMIN — ACETAMINOPHEN PRN MG: 325 TABLET ORAL at 21:10

## 2022-07-28 RX ADMIN — THIAMINE HYDROCHLORIDE SCH MG: 100 INJECTION, SOLUTION INTRAMUSCULAR; INTRAVENOUS at 13:30

## 2022-07-28 RX ADMIN — ENOXAPARIN SODIUM SCH MG: 100 INJECTION SUBCUTANEOUS at 21:10

## 2022-07-28 RX ADMIN — HYDROCORTISONE PRN APPLIC: 0.5 CREAM TOPICAL at 22:44

## 2022-07-29 LAB
ALBUMIN SERPL-MCNC: 3.1 G/DL (ref 3.4–5)
ALP SERPL-CCNC: 370 U/L (ref 45–117)
ALT SERPL-CCNC: 91 U/L (ref 13–61)
ANION GAP SERPL CALC-SCNC: 7 MMOL/L (ref 8–16)
AST SERPL-CCNC: 73 U/L (ref 15–37)
BASOPHILS # BLD: 1.5 % (ref 0–2)
BILIRUB SERPL-MCNC: 0.8 MG/DL (ref 0.2–1)
BUN SERPL-MCNC: 14.4 MG/DL (ref 7–18)
CALCIUM SERPL-MCNC: 9.2 MG/DL (ref 8.5–10.1)
CHLORIDE SERPL-SCNC: 102 MMOL/L (ref 98–107)
CO2 SERPL-SCNC: 29 MMOL/L (ref 21–32)
CREAT SERPL-MCNC: 1 MG/DL (ref 0.55–1.3)
DEPRECATED RDW RBC AUTO: 13.1 % (ref 11.9–15.9)
EOSINOPHIL # BLD: 4.5 % (ref 0–4.5)
GLUCOSE SERPL-MCNC: 115 MG/DL (ref 74–106)
HCT VFR BLD CALC: 36.6 % (ref 35.4–49)
HGB BLD-MCNC: 12.3 GM/DL (ref 11.7–16.9)
LYMPHOCYTES # BLD: 11.9 % (ref 8–40)
MAGNESIUM SERPL-MCNC: 2 MG/DL (ref 1.8–2.4)
MCH RBC QN AUTO: 31.7 PG (ref 25.7–33.7)
MCHC RBC AUTO-ENTMCNC: 33.6 G/DL (ref 32–35.9)
MCV RBC: 94.4 FL (ref 80–96)
MONOCYTES # BLD AUTO: 8.4 % (ref 3.8–10.2)
NEUTROPHILS # BLD: 73.7 % (ref 42.8–82.8)
PHOSPHATE SERPL-MCNC: 3.8 MG/DL (ref 2.5–4.9)
PLATELET # BLD AUTO: 231 10^3/UL (ref 134–434)
PMV BLD: 7.9 FL (ref 7.5–11.1)
PROT SERPL-MCNC: 6.6 G/DL (ref 6.4–8.2)
RBC # BLD AUTO: 3.88 M/MM3 (ref 4–5.6)
SODIUM SERPL-SCNC: 138 MMOL/L (ref 136–145)
WBC # BLD AUTO: 10.6 K/MM3 (ref 4–10)

## 2022-07-29 RX ADMIN — POLYETHYLENE GLYCOL 3350 SCH: 17 POWDER, FOR SOLUTION ORAL at 10:00

## 2022-07-29 RX ADMIN — AMLODIPINE BESYLATE SCH MG: 2.5 TABLET ORAL at 09:56

## 2022-07-29 RX ADMIN — HYDROCORTISONE PRN APPLIC: 0.5 CREAM TOPICAL at 21:49

## 2022-07-29 RX ADMIN — ENOXAPARIN SODIUM SCH MG: 100 INJECTION SUBCUTANEOUS at 21:48

## 2022-07-29 RX ADMIN — THIAMINE HYDROCHLORIDE SCH MG: 100 INJECTION, SOLUTION INTRAMUSCULAR; INTRAVENOUS at 06:13

## 2022-07-29 RX ADMIN — THIAMINE HYDROCHLORIDE SCH MG: 100 INJECTION, SOLUTION INTRAMUSCULAR; INTRAVENOUS at 21:49

## 2022-07-29 RX ADMIN — POLYETHYLENE GLYCOL 3350 SCH: 17 POWDER, FOR SOLUTION ORAL at 21:49

## 2022-07-29 RX ADMIN — PANTOPRAZOLE SODIUM SCH MG: 40 TABLET, DELAYED RELEASE ORAL at 09:57

## 2022-07-29 RX ADMIN — ATORVASTATIN CALCIUM SCH MG: 80 TABLET, FILM COATED ORAL at 21:49

## 2022-07-29 RX ADMIN — PANTOPRAZOLE SODIUM SCH MG: 40 TABLET, DELAYED RELEASE ORAL at 21:49

## 2022-07-29 RX ADMIN — ENOXAPARIN SODIUM SCH MG: 100 INJECTION SUBCUTANEOUS at 06:13

## 2022-07-29 RX ADMIN — ASPIRIN 81 MG SCH MG: 81 TABLET ORAL at 09:57

## 2022-07-29 RX ADMIN — NEBIVOLOL HYDROCHLORIDE SCH MG: 10 TABLET ORAL at 09:56

## 2022-07-29 RX ADMIN — THIAMINE HYDROCHLORIDE SCH MG: 100 INJECTION, SOLUTION INTRAMUSCULAR; INTRAVENOUS at 13:23

## 2022-07-30 LAB
ALBUMIN SERPL-MCNC: 3.5 G/DL (ref 3.4–5)
ALP SERPL-CCNC: 465 U/L (ref 45–117)
ALT SERPL-CCNC: 117 U/L (ref 13–61)
ANION GAP SERPL CALC-SCNC: 7 MMOL/L (ref 8–16)
AST SERPL-CCNC: 97 U/L (ref 15–37)
BASOPHILS # BLD: 0.9 % (ref 0–2)
BILIRUB SERPL-MCNC: 0.6 MG/DL (ref 0.2–1)
BUN SERPL-MCNC: 18.8 MG/DL (ref 7–18)
CALCIUM SERPL-MCNC: 9.4 MG/DL (ref 8.5–10.1)
CHLORIDE SERPL-SCNC: 102 MMOL/L (ref 98–107)
CO2 SERPL-SCNC: 29 MMOL/L (ref 21–32)
CREAT SERPL-MCNC: 1.1 MG/DL (ref 0.55–1.3)
DEPRECATED RDW RBC AUTO: 12.7 % (ref 11.9–15.9)
EOSINOPHIL # BLD: 3.6 % (ref 0–4.5)
GLUCOSE SERPL-MCNC: 140 MG/DL (ref 74–106)
HCT VFR BLD CALC: 38.2 % (ref 35.4–49)
HGB BLD-MCNC: 13 GM/DL (ref 11.7–16.9)
LYMPHOCYTES # BLD: 13.1 % (ref 8–40)
MAGNESIUM SERPL-MCNC: 1.9 MG/DL (ref 1.8–2.4)
MCH RBC QN AUTO: 32.1 PG (ref 25.7–33.7)
MCHC RBC AUTO-ENTMCNC: 34 G/DL (ref 32–35.9)
MCV RBC: 94.4 FL (ref 80–96)
MONOCYTES # BLD AUTO: 8.8 % (ref 3.8–10.2)
NEUTROPHILS # BLD: 73.6 % (ref 42.8–82.8)
PHOSPHATE SERPL-MCNC: 3.8 MG/DL (ref 2.5–4.9)
PLATELET # BLD AUTO: 336 10^3/UL (ref 134–434)
PMV BLD: 7.2 FL (ref 7.5–11.1)
PROT SERPL-MCNC: 7.4 G/DL (ref 6.4–8.2)
RBC # BLD AUTO: 4.05 M/MM3 (ref 4–5.6)
SODIUM SERPL-SCNC: 139 MMOL/L (ref 136–145)
WBC # BLD AUTO: 12.6 K/MM3 (ref 4–10)

## 2022-07-30 RX ADMIN — ENOXAPARIN SODIUM SCH MG: 100 INJECTION SUBCUTANEOUS at 20:11

## 2022-07-30 RX ADMIN — THIAMINE HYDROCHLORIDE SCH MG: 100 INJECTION, SOLUTION INTRAMUSCULAR; INTRAVENOUS at 13:48

## 2022-07-30 RX ADMIN — ENOXAPARIN SODIUM SCH MG: 100 INJECTION SUBCUTANEOUS at 06:18

## 2022-07-30 RX ADMIN — ATORVASTATIN CALCIUM SCH MG: 80 TABLET, FILM COATED ORAL at 21:12

## 2022-07-30 RX ADMIN — POLYETHYLENE GLYCOL 3350 SCH GM: 17 POWDER, FOR SOLUTION ORAL at 09:12

## 2022-07-30 RX ADMIN — AMLODIPINE BESYLATE SCH MG: 2.5 TABLET ORAL at 09:11

## 2022-07-30 RX ADMIN — THIAMINE HYDROCHLORIDE SCH MG: 100 INJECTION, SOLUTION INTRAMUSCULAR; INTRAVENOUS at 03:44

## 2022-07-30 RX ADMIN — POLYETHYLENE GLYCOL 3350 SCH: 17 POWDER, FOR SOLUTION ORAL at 21:12

## 2022-07-30 RX ADMIN — NEBIVOLOL HYDROCHLORIDE SCH MG: 10 TABLET ORAL at 09:11

## 2022-07-30 RX ADMIN — ASPIRIN 81 MG SCH MG: 81 TABLET ORAL at 09:12

## 2022-07-30 RX ADMIN — PANTOPRAZOLE SODIUM SCH MG: 40 TABLET, DELAYED RELEASE ORAL at 09:12

## 2022-07-30 RX ADMIN — THIAMINE HYDROCHLORIDE SCH MG: 100 INJECTION, SOLUTION INTRAMUSCULAR; INTRAVENOUS at 20:11

## 2022-07-30 RX ADMIN — PANTOPRAZOLE SODIUM SCH MG: 40 TABLET, DELAYED RELEASE ORAL at 21:12

## 2022-07-31 LAB
ALBUMIN SERPL-MCNC: 3.3 G/DL (ref 3.4–5)
ALP SERPL-CCNC: 437 U/L (ref 45–117)
ALT SERPL-CCNC: 105 U/L (ref 13–61)
ANION GAP SERPL CALC-SCNC: 7 MMOL/L (ref 8–16)
AST SERPL-CCNC: 82 U/L (ref 15–37)
BASOPHILS # BLD: 1 % (ref 0–2)
BILIRUB SERPL-MCNC: 0.7 MG/DL (ref 0.2–1)
BUN SERPL-MCNC: 16.4 MG/DL (ref 7–18)
CALCIUM SERPL-MCNC: 9.1 MG/DL (ref 8.5–10.1)
CHLORIDE SERPL-SCNC: 101 MMOL/L (ref 98–107)
CO2 SERPL-SCNC: 30 MMOL/L (ref 21–32)
CREAT SERPL-MCNC: 1 MG/DL (ref 0.55–1.3)
DEPRECATED RDW RBC AUTO: 12.9 % (ref 11.9–15.9)
EOSINOPHIL # BLD: 3.2 % (ref 0–4.5)
GLUCOSE SERPL-MCNC: 132 MG/DL (ref 74–106)
HCT VFR BLD CALC: 35.9 % (ref 35.4–49)
HGB BLD-MCNC: 12.3 GM/DL (ref 11.7–16.9)
LYMPHOCYTES # BLD: 13.9 % (ref 8–40)
MCH RBC QN AUTO: 32.2 PG (ref 25.7–33.7)
MCHC RBC AUTO-ENTMCNC: 34.3 G/DL (ref 32–35.9)
MCV RBC: 93.7 FL (ref 80–96)
MONOCYTES # BLD AUTO: 10.3 % (ref 3.8–10.2)
NEUTROPHILS # BLD: 71.6 % (ref 42.8–82.8)
PLATELET # BLD AUTO: 315 10^3/UL (ref 134–434)
PMV BLD: 7.8 FL (ref 7.5–11.1)
PROT SERPL-MCNC: 7 G/DL (ref 6.4–8.2)
RBC # BLD AUTO: 3.83 M/MM3 (ref 4–5.6)
SODIUM SERPL-SCNC: 138 MMOL/L (ref 136–145)
WBC # BLD AUTO: 12.5 K/MM3 (ref 4–10)

## 2022-07-31 RX ADMIN — ENOXAPARIN SODIUM SCH MG: 100 INJECTION SUBCUTANEOUS at 06:20

## 2022-07-31 RX ADMIN — ASPIRIN 81 MG SCH MG: 81 TABLET ORAL at 09:21

## 2022-07-31 RX ADMIN — THIAMINE HYDROCHLORIDE SCH MG: 100 INJECTION, SOLUTION INTRAMUSCULAR; INTRAVENOUS at 13:11

## 2022-07-31 RX ADMIN — ATORVASTATIN CALCIUM SCH MG: 80 TABLET, FILM COATED ORAL at 21:03

## 2022-07-31 RX ADMIN — THIAMINE HYDROCHLORIDE SCH MG: 100 INJECTION, SOLUTION INTRAMUSCULAR; INTRAVENOUS at 04:51

## 2022-07-31 RX ADMIN — THIAMINE HYDROCHLORIDE SCH MG: 100 INJECTION, SOLUTION INTRAMUSCULAR; INTRAVENOUS at 20:59

## 2022-07-31 RX ADMIN — NEBIVOLOL HYDROCHLORIDE SCH MG: 10 TABLET ORAL at 09:20

## 2022-07-31 RX ADMIN — AMLODIPINE BESYLATE SCH MG: 2.5 TABLET ORAL at 09:21

## 2022-07-31 RX ADMIN — ACETAMINOPHEN PRN MG: 325 TABLET ORAL at 21:07

## 2022-07-31 RX ADMIN — PANTOPRAZOLE SODIUM SCH MG: 40 TABLET, DELAYED RELEASE ORAL at 09:21

## 2022-07-31 RX ADMIN — PANTOPRAZOLE SODIUM SCH MG: 40 TABLET, DELAYED RELEASE ORAL at 20:59

## 2022-07-31 RX ADMIN — ACETAMINOPHEN PRN MG: 325 TABLET ORAL at 09:24

## 2022-07-31 RX ADMIN — POLYETHYLENE GLYCOL 3350 SCH: 17 POWDER, FOR SOLUTION ORAL at 21:06

## 2022-07-31 RX ADMIN — POLYETHYLENE GLYCOL 3350 SCH GM: 17 POWDER, FOR SOLUTION ORAL at 09:21

## 2022-08-01 PROCEDURE — 0DBP8ZX EXCISION OF RECTUM, VIA NATURAL OR ARTIFICIAL OPENING ENDOSCOPIC, DIAGNOSTIC: ICD-10-PCS | Performed by: INTERNAL MEDICINE

## 2022-08-01 PROCEDURE — 0DBH8ZX EXCISION OF CECUM, VIA NATURAL OR ARTIFICIAL OPENING ENDOSCOPIC, DIAGNOSTIC: ICD-10-PCS | Performed by: INTERNAL MEDICINE

## 2022-08-01 PROCEDURE — 0DBN8ZX EXCISION OF SIGMOID COLON, VIA NATURAL OR ARTIFICIAL OPENING ENDOSCOPIC, DIAGNOSTIC: ICD-10-PCS | Performed by: INTERNAL MEDICINE

## 2022-08-01 PROCEDURE — 0DBL8ZX EXCISION OF TRANSVERSE COLON, VIA NATURAL OR ARTIFICIAL OPENING ENDOSCOPIC, DIAGNOSTIC: ICD-10-PCS | Performed by: INTERNAL MEDICINE

## 2022-08-01 PROCEDURE — 0DB58ZX EXCISION OF ESOPHAGUS, VIA NATURAL OR ARTIFICIAL OPENING ENDOSCOPIC, DIAGNOSTIC: ICD-10-PCS | Performed by: INTERNAL MEDICINE

## 2022-08-01 RX ADMIN — ATORVASTATIN CALCIUM SCH MG: 80 TABLET, FILM COATED ORAL at 21:09

## 2022-08-01 RX ADMIN — ACETAMINOPHEN PRN MG: 325 TABLET ORAL at 21:09

## 2022-08-01 RX ADMIN — NEBIVOLOL HYDROCHLORIDE SCH MG: 10 TABLET ORAL at 10:54

## 2022-08-01 RX ADMIN — POLYETHYLENE GLYCOL 3350 SCH: 17 POWDER, FOR SOLUTION ORAL at 10:00

## 2022-08-01 RX ADMIN — ASPIRIN 81 MG SCH MG: 81 TABLET ORAL at 10:54

## 2022-08-01 RX ADMIN — ACETAMINOPHEN PRN MG: 325 TABLET ORAL at 11:13

## 2022-08-01 RX ADMIN — THIAMINE HYDROCHLORIDE SCH MG: 100 INJECTION, SOLUTION INTRAMUSCULAR; INTRAVENOUS at 06:21

## 2022-08-01 RX ADMIN — PANTOPRAZOLE SODIUM SCH MG: 40 TABLET, DELAYED RELEASE ORAL at 21:09

## 2022-08-01 RX ADMIN — AMLODIPINE BESYLATE SCH MG: 2.5 TABLET ORAL at 10:53

## 2022-08-01 RX ADMIN — POLYETHYLENE GLYCOL 3350 SCH: 17 POWDER, FOR SOLUTION ORAL at 21:09

## 2022-08-01 RX ADMIN — PANTOPRAZOLE SODIUM SCH MG: 40 TABLET, DELAYED RELEASE ORAL at 10:53

## 2022-08-01 RX ADMIN — PREDNISONE SCH MG: 20 TABLET ORAL at 14:30

## 2022-08-01 RX ADMIN — Medication SCH MG: at 10:53

## 2022-08-02 VITALS — SYSTOLIC BLOOD PRESSURE: 151 MMHG | DIASTOLIC BLOOD PRESSURE: 59 MMHG | RESPIRATION RATE: 18 BRPM | HEART RATE: 55 BPM

## 2022-08-02 VITALS — TEMPERATURE: 98.6 F

## 2022-08-02 LAB
ALBUMIN SERPL-MCNC: 2.8 G/DL (ref 3.4–5)
ALP SERPL-CCNC: 303 U/L (ref 45–117)
ALT SERPL-CCNC: 78 U/L (ref 13–61)
ANION GAP SERPL CALC-SCNC: 10 MMOL/L (ref 8–16)
APPEARANCE UR: CLEAR
AST SERPL-CCNC: 76 U/L (ref 15–37)
BACTERIA # UR AUTO: 4 /UL (ref 0–1359)
BASOPHILS # BLD: 0.2 % (ref 0–2)
BILIRUB SERPL-MCNC: 0.8 MG/DL (ref 0.2–1)
BILIRUB UR STRIP.AUTO-MCNC: NEGATIVE MG/DL
BUN SERPL-MCNC: 18.1 MG/DL (ref 7–18)
CALCIUM SERPL-MCNC: 8.9 MG/DL (ref 8.5–10.1)
CASTS URNS QL MICRO: 4 /UL (ref 0–3.1)
CHLORIDE SERPL-SCNC: 101 MMOL/L (ref 98–107)
CO2 SERPL-SCNC: 27 MMOL/L (ref 21–32)
COLOR UR: YELLOW
CREAT SERPL-MCNC: 1 MG/DL (ref 0.55–1.3)
DEPRECATED RDW RBC AUTO: 12.9 % (ref 11.9–15.9)
EOSINOPHIL # BLD: 0 % (ref 0–4.5)
EPITH CASTS URNS QL MICRO: 6 /UL (ref 0–25.1)
GLUCOSE SERPL-MCNC: 157 MG/DL (ref 74–106)
HCT VFR BLD CALC: 34 % (ref 35.4–49)
HGB BLD-MCNC: 11.7 GM/DL (ref 11.7–16.9)
KETONES UR QL STRIP: NEGATIVE
LEUKOCYTE ESTERASE UR QL STRIP.AUTO: NEGATIVE
LYMPHOCYTES # BLD: 7.8 % (ref 8–40)
MAGNESIUM SERPL-MCNC: 1.8 MG/DL (ref 1.8–2.4)
MCH RBC QN AUTO: 31.7 PG (ref 25.7–33.7)
MCHC RBC AUTO-ENTMCNC: 34.3 G/DL (ref 32–35.9)
MCV RBC: 92.2 FL (ref 80–96)
MONOCYTES # BLD AUTO: 7.5 % (ref 3.8–10.2)
NEUTROPHILS # BLD: 84.5 % (ref 42.8–82.8)
NITRITE UR QL STRIP: NEGATIVE
PH UR: 5.5 [PH] (ref 5–8)
PHOSPHATE SERPL-MCNC: 3.6 MG/DL (ref 2.5–4.9)
PLATELET # BLD AUTO: 235 10^3/UL (ref 134–434)
PMV BLD: 7.5 FL (ref 7.5–11.1)
PROT SERPL-MCNC: 6.6 G/DL (ref 6.4–8.2)
PROT UR QL STRIP: (no result)
PROT UR QL STRIP: NEGATIVE
RBC # BLD AUTO: 3.68 M/MM3 (ref 4–5.6)
RBC # BLD AUTO: 9 /UL (ref 0–23.9)
SODIUM SERPL-SCNC: 137 MMOL/L (ref 136–145)
SP GR UR: 1.02 (ref 1.01–1.03)
UROBILINOGEN UR STRIP-MCNC: 0.2 MG/DL (ref 0.2–1)
WBC # BLD AUTO: 14.8 K/MM3 (ref 4–10)
WBC # UR AUTO: 8 /UL (ref 0–25.8)

## 2022-08-02 RX ADMIN — PREDNISONE SCH MG: 20 TABLET ORAL at 09:38

## 2022-08-02 RX ADMIN — POLYETHYLENE GLYCOL 3350 SCH: 17 POWDER, FOR SOLUTION ORAL at 21:03

## 2022-08-02 RX ADMIN — PANTOPRAZOLE SODIUM SCH MG: 40 TABLET, DELAYED RELEASE ORAL at 21:03

## 2022-08-02 RX ADMIN — NEBIVOLOL HYDROCHLORIDE SCH MG: 10 TABLET ORAL at 09:39

## 2022-08-02 RX ADMIN — POLYETHYLENE GLYCOL 3350 SCH: 17 POWDER, FOR SOLUTION ORAL at 09:42

## 2022-08-02 RX ADMIN — PANTOPRAZOLE SODIUM SCH MG: 40 TABLET, DELAYED RELEASE ORAL at 09:38

## 2022-08-02 RX ADMIN — ASPIRIN 81 MG SCH MG: 81 TABLET ORAL at 09:37

## 2022-08-02 RX ADMIN — ATORVASTATIN CALCIUM SCH MG: 80 TABLET, FILM COATED ORAL at 21:03

## 2022-08-02 RX ADMIN — ENOXAPARIN SODIUM SCH MG: 100 INJECTION SUBCUTANEOUS at 19:27

## 2022-08-02 RX ADMIN — AMLODIPINE BESYLATE SCH MG: 2.5 TABLET ORAL at 09:37

## 2022-08-02 RX ADMIN — Medication SCH MG: at 09:38

## 2022-08-10 ENCOUNTER — HOSPITAL ENCOUNTER (INPATIENT)
Dept: HOSPITAL 74 - JER | Age: 67
LOS: 8 days | Discharge: NURSING, REHAB OR LONG-TERM CARE | DRG: 435 | End: 2022-08-18
Attending: GENERAL ACUTE CARE HOSPITAL | Admitting: HOSPITALIST
Payer: COMMERCIAL

## 2022-08-10 VITALS — BODY MASS INDEX: 25.1 KG/M2

## 2022-08-10 DIAGNOSIS — C25.9: Primary | ICD-10-CM

## 2022-08-10 DIAGNOSIS — C78.7: ICD-10-CM

## 2022-08-10 DIAGNOSIS — R09.02: ICD-10-CM

## 2022-08-10 DIAGNOSIS — E51.2: ICD-10-CM

## 2022-08-10 DIAGNOSIS — J90: ICD-10-CM

## 2022-08-10 DIAGNOSIS — E78.5: ICD-10-CM

## 2022-08-10 DIAGNOSIS — I25.10: ICD-10-CM

## 2022-08-10 DIAGNOSIS — I21.4: ICD-10-CM

## 2022-08-10 DIAGNOSIS — I10: ICD-10-CM

## 2022-08-10 DIAGNOSIS — I26.99: ICD-10-CM

## 2022-08-10 DIAGNOSIS — I82.402: ICD-10-CM

## 2022-08-10 LAB
ALBUMIN SERPL-MCNC: 2.8 G/DL (ref 3.4–5)
ALP SERPL-CCNC: 382 U/L (ref 45–117)
ALT SERPL-CCNC: 68 U/L (ref 13–61)
ANION GAP SERPL CALC-SCNC: 12 MMOL/L (ref 8–16)
AST SERPL-CCNC: 59 U/L (ref 15–37)
BASOPHILS # BLD: 0.6 % (ref 0–2)
BILIRUB CONJ SERPL-MCNC: 0.7 MG/DL (ref 0–0.2)
BILIRUB SERPL-MCNC: 1 MG/DL (ref 0.2–1)
BUN SERPL-MCNC: 20 MG/DL (ref 7–18)
CALCIUM SERPL-MCNC: 8.8 MG/DL (ref 8.5–10.1)
CHLORIDE SERPL-SCNC: 99 MMOL/L (ref 98–107)
CO2 SERPL-SCNC: 25 MMOL/L (ref 21–32)
CREAT SERPL-MCNC: 1.2 MG/DL (ref 0.55–1.3)
DEPRECATED RDW RBC AUTO: 13.2 % (ref 11.9–15.9)
EOSINOPHIL # BLD: 1.2 % (ref 0–4.5)
GLUCOSE SERPL-MCNC: 109 MG/DL (ref 74–106)
HCT VFR BLD CALC: 33.3 % (ref 35.4–49)
HGB BLD-MCNC: 11.5 GM/DL (ref 11.7–16.9)
LIPASE SERPL-CCNC: 320 U/L (ref 73–393)
LYMPHOCYTES # BLD: 8.3 % (ref 8–40)
MCH RBC QN AUTO: 31.8 PG (ref 25.7–33.7)
MCHC RBC AUTO-ENTMCNC: 34.4 G/DL (ref 32–35.9)
MCV RBC: 92.6 FL (ref 80–96)
MONOCYTES # BLD AUTO: 8.5 % (ref 3.8–10.2)
NEUTROPHILS # BLD: 81.4 % (ref 42.8–82.8)
PLATELET # BLD AUTO: 148 10^3/UL (ref 134–434)
PMV BLD: 7.4 FL (ref 7.5–11.1)
PROT SERPL-MCNC: 6.7 G/DL (ref 6.4–8.2)
RBC # BLD AUTO: 3.6 M/MM3 (ref 4–5.6)
SODIUM SERPL-SCNC: 135 MMOL/L (ref 136–145)
WBC # BLD AUTO: 16 K/MM3 (ref 4–10)

## 2022-08-10 PROCEDURE — U0003 INFECTIOUS AGENT DETECTION BY NUCLEIC ACID (DNA OR RNA); SEVERE ACUTE RESPIRATORY SYNDROME CORONAVIRUS 2 (SARS-COV-2) (CORONAVIRUS DISEASE [COVID-19]), AMPLIFIED PROBE TECHNIQUE, MAKING USE OF HIGH THROUGHPUT TECHNOLOGIES AS DESCRIBED BY CMS-2020-01-R: HCPCS

## 2022-08-10 PROCEDURE — U0005 INFEC AGEN DETEC AMPLI PROBE: HCPCS

## 2022-08-11 LAB
ALBUMIN SERPL-MCNC: 2.7 G/DL (ref 3.4–5)
ALP SERPL-CCNC: 331 U/L (ref 45–117)
ALT SERPL-CCNC: 60 U/L (ref 13–61)
ANION GAP SERPL CALC-SCNC: 12 MMOL/L (ref 8–16)
APPEARANCE UR: (no result)
AST SERPL-CCNC: 48 U/L (ref 15–37)
BACTERIA # UR AUTO: 4 /UL (ref 0–1359)
BILIRUB SERPL-MCNC: 0.9 MG/DL (ref 0.2–1)
BILIRUB UR STRIP.AUTO-MCNC: (no result) MG/DL
BUN SERPL-MCNC: 18.8 MG/DL (ref 7–18)
CALCIUM SERPL-MCNC: 8.4 MG/DL (ref 8.5–10.1)
CASTS URNS QL MICRO: 14 /UL (ref 0–3.1)
CHLORIDE SERPL-SCNC: 100 MMOL/L (ref 98–107)
CO2 SERPL-SCNC: 25 MMOL/L (ref 21–32)
COLOR UR: (no result)
CREAT SERPL-MCNC: 0.9 MG/DL (ref 0.55–1.3)
DEPRECATED RDW RBC AUTO: 13.3 % (ref 11.9–15.9)
EPITH CASTS URNS QL MICRO: 14 /UL (ref 0–25.1)
GLUCOSE SERPL-MCNC: 89 MG/DL (ref 74–106)
HCT VFR BLD CALC: 34 % (ref 35.4–49)
HGB BLD-MCNC: 11.4 GM/DL (ref 11.7–16.9)
KETONES UR QL STRIP: (no result)
LEUKOCYTE ESTERASE UR QL STRIP.AUTO: NEGATIVE
MAGNESIUM SERPL-MCNC: 1.6 MG/DL (ref 1.8–2.4)
MCH RBC QN AUTO: 31 PG (ref 25.7–33.7)
MCHC RBC AUTO-ENTMCNC: 33.4 G/DL (ref 32–35.9)
MCV RBC: 92.8 FL (ref 80–96)
NITRITE UR QL STRIP: NEGATIVE
PH UR: 5 [PH] (ref 5–8)
PHOSPHATE SERPL-MCNC: 3.5 MG/DL (ref 2.5–4.9)
PLATELET # BLD AUTO: 157 10^3/UL (ref 134–434)
PMV BLD: 8.2 FL (ref 7.5–11.1)
PROT SERPL-MCNC: 6.2 G/DL (ref 6.4–8.2)
PROT UR QL STRIP: (no result)
PROT UR QL STRIP: NEGATIVE
RBC # BLD AUTO: 3.66 M/MM3 (ref 4–5.6)
SODIUM SERPL-SCNC: 137 MMOL/L (ref 136–145)
SP GR UR: 1.03 (ref 1.01–1.03)
UROBILINOGEN UR STRIP-MCNC: 0.2 MG/DL (ref 0.2–1)
WBC # BLD AUTO: 16 K/MM3 (ref 4–10)
WBC # UR AUTO: 30 /UL (ref 0–25.8)

## 2022-08-11 RX ADMIN — THIAMINE HYDROCHLORIDE SCH MG: 100 INJECTION, SOLUTION INTRAMUSCULAR; INTRAVENOUS at 10:02

## 2022-08-11 RX ADMIN — AMLODIPINE BESYLATE SCH MG: 5 TABLET ORAL at 10:02

## 2022-08-11 RX ADMIN — PANTOPRAZOLE SODIUM SCH MG: 40 INJECTION, POWDER, FOR SOLUTION INTRAVENOUS at 10:02

## 2022-08-11 RX ADMIN — FOLIC ACID SCH MG: 1 TABLET ORAL at 10:03

## 2022-08-11 RX ADMIN — ATORVASTATIN CALCIUM SCH MG: 80 TABLET, FILM COATED ORAL at 21:49

## 2022-08-11 RX ADMIN — ENOXAPARIN SODIUM SCH MG: 100 INJECTION SUBCUTANEOUS at 21:49

## 2022-08-11 RX ADMIN — FERROUS SULFATE TAB EC 324 MG (65 MG FE EQUIVALENT) SCH MG: 324 (65 FE) TABLET DELAYED RESPONSE at 10:03

## 2022-08-11 RX ADMIN — ENOXAPARIN SODIUM SCH MG: 100 INJECTION SUBCUTANEOUS at 10:03

## 2022-08-12 LAB
ALBUMIN SERPL-MCNC: 2.4 G/DL (ref 3.4–5)
ALP SERPL-CCNC: 279 U/L (ref 45–117)
ALT SERPL-CCNC: 48 U/L (ref 13–61)
ANION GAP SERPL CALC-SCNC: 9 MMOL/L (ref 8–16)
AST SERPL-CCNC: 53 U/L (ref 15–37)
BILIRUB SERPL-MCNC: 0.9 MG/DL (ref 0.2–1)
BUN SERPL-MCNC: 22.5 MG/DL (ref 7–18)
CALCIUM SERPL-MCNC: 8.3 MG/DL (ref 8.5–10.1)
CHLORIDE SERPL-SCNC: 99 MMOL/L (ref 98–107)
CO2 SERPL-SCNC: 26 MMOL/L (ref 21–32)
CREAT SERPL-MCNC: 0.9 MG/DL (ref 0.55–1.3)
DEPRECATED RDW RBC AUTO: 13.2 % (ref 11.9–15.9)
GLUCOSE SERPL-MCNC: 79 MG/DL (ref 74–106)
HCT VFR BLD CALC: 30.6 % (ref 35.4–49)
HGB BLD-MCNC: 10.4 GM/DL (ref 11.7–16.9)
MAGNESIUM SERPL-MCNC: 2 MG/DL (ref 1.8–2.4)
MCH RBC QN AUTO: 31 PG (ref 25.7–33.7)
MCHC RBC AUTO-ENTMCNC: 33.8 G/DL (ref 32–35.9)
MCV RBC: 91.7 FL (ref 80–96)
PLATELET # BLD AUTO: 183 10^3/UL (ref 134–434)
PMV BLD: 7.6 FL (ref 7.5–11.1)
PROT SERPL-MCNC: 5.9 G/DL (ref 6.4–8.2)
RBC # BLD AUTO: 3.34 M/MM3 (ref 4–5.6)
SODIUM SERPL-SCNC: 134 MMOL/L (ref 136–145)
WBC # BLD AUTO: 16.2 K/MM3 (ref 4–10)

## 2022-08-12 RX ADMIN — ENOXAPARIN SODIUM SCH MG: 100 INJECTION SUBCUTANEOUS at 21:38

## 2022-08-12 RX ADMIN — ENOXAPARIN SODIUM SCH MG: 100 INJECTION SUBCUTANEOUS at 10:22

## 2022-08-12 RX ADMIN — ATORVASTATIN CALCIUM SCH MG: 80 TABLET, FILM COATED ORAL at 21:38

## 2022-08-12 RX ADMIN — THIAMINE HYDROCHLORIDE SCH MG: 100 INJECTION, SOLUTION INTRAMUSCULAR; INTRAVENOUS at 10:34

## 2022-08-12 RX ADMIN — FOLIC ACID SCH MG: 1 TABLET ORAL at 10:25

## 2022-08-12 RX ADMIN — AMLODIPINE BESYLATE SCH MG: 5 TABLET ORAL at 10:26

## 2022-08-12 RX ADMIN — FERROUS SULFATE TAB EC 324 MG (65 MG FE EQUIVALENT) SCH MG: 324 (65 FE) TABLET DELAYED RESPONSE at 10:25

## 2022-08-12 RX ADMIN — PANTOPRAZOLE SODIUM SCH MG: 40 INJECTION, POWDER, FOR SOLUTION INTRAVENOUS at 10:26

## 2022-08-13 LAB
ALBUMIN SERPL-MCNC: 2.4 G/DL (ref 3.4–5)
ALP SERPL-CCNC: 312 U/L (ref 45–117)
ALT SERPL-CCNC: 49 U/L (ref 13–61)
ANION GAP SERPL CALC-SCNC: 9 MMOL/L (ref 8–16)
AST SERPL-CCNC: 62 U/L (ref 15–37)
BILIRUB SERPL-MCNC: 0.8 MG/DL (ref 0.2–1)
BUN SERPL-MCNC: 16.1 MG/DL (ref 7–18)
CALCIUM SERPL-MCNC: 8.2 MG/DL (ref 8.5–10.1)
CHLORIDE SERPL-SCNC: 100 MMOL/L (ref 98–107)
CO2 SERPL-SCNC: 25 MMOL/L (ref 21–32)
CREAT SERPL-MCNC: 0.8 MG/DL (ref 0.55–1.3)
DEPRECATED RDW RBC AUTO: 13.4 % (ref 11.9–15.9)
GLUCOSE SERPL-MCNC: 139 MG/DL (ref 74–106)
HCT VFR BLD CALC: 31.3 % (ref 35.4–49)
HGB BLD-MCNC: 10.8 GM/DL (ref 11.7–16.9)
MCH RBC QN AUTO: 31.4 PG (ref 25.7–33.7)
MCHC RBC AUTO-ENTMCNC: 34.4 G/DL (ref 32–35.9)
MCV RBC: 91.2 FL (ref 80–96)
PLATELET # BLD AUTO: 218 10^3/UL (ref 134–434)
PMV BLD: 7.7 FL (ref 7.5–11.1)
PROT SERPL-MCNC: 6 G/DL (ref 6.4–8.2)
RBC # BLD AUTO: 3.43 M/MM3 (ref 4–5.6)
SODIUM SERPL-SCNC: 135 MMOL/L (ref 136–145)
WBC # BLD AUTO: 16 K/MM3 (ref 4–10)

## 2022-08-13 RX ADMIN — AMLODIPINE BESYLATE SCH MG: 5 TABLET ORAL at 09:50

## 2022-08-13 RX ADMIN — ENOXAPARIN SODIUM SCH MG: 100 INJECTION SUBCUTANEOUS at 21:50

## 2022-08-13 RX ADMIN — FOLIC ACID SCH MG: 1 TABLET ORAL at 09:50

## 2022-08-13 RX ADMIN — THIAMINE HYDROCHLORIDE SCH MG: 100 INJECTION, SOLUTION INTRAMUSCULAR; INTRAVENOUS at 10:00

## 2022-08-13 RX ADMIN — PANTOPRAZOLE SODIUM SCH MG: 40 INJECTION, POWDER, FOR SOLUTION INTRAVENOUS at 09:50

## 2022-08-13 RX ADMIN — ATORVASTATIN CALCIUM SCH MG: 80 TABLET, FILM COATED ORAL at 21:48

## 2022-08-13 RX ADMIN — FERROUS SULFATE TAB EC 324 MG (65 MG FE EQUIVALENT) SCH MG: 324 (65 FE) TABLET DELAYED RESPONSE at 09:50

## 2022-08-13 RX ADMIN — ENOXAPARIN SODIUM SCH MG: 100 INJECTION SUBCUTANEOUS at 09:50

## 2022-08-14 RX ADMIN — ENOXAPARIN SODIUM SCH MG: 100 INJECTION SUBCUTANEOUS at 09:25

## 2022-08-14 RX ADMIN — FERROUS SULFATE TAB EC 324 MG (65 MG FE EQUIVALENT) SCH MG: 324 (65 FE) TABLET DELAYED RESPONSE at 09:24

## 2022-08-14 RX ADMIN — PANTOPRAZOLE SODIUM SCH MG: 40 INJECTION, POWDER, FOR SOLUTION INTRAVENOUS at 09:25

## 2022-08-14 RX ADMIN — ENOXAPARIN SODIUM SCH MG: 100 INJECTION SUBCUTANEOUS at 21:44

## 2022-08-14 RX ADMIN — FOLIC ACID SCH MG: 1 TABLET ORAL at 09:24

## 2022-08-14 RX ADMIN — ATORVASTATIN CALCIUM SCH MG: 80 TABLET, FILM COATED ORAL at 21:44

## 2022-08-14 RX ADMIN — AMLODIPINE BESYLATE SCH MG: 5 TABLET ORAL at 09:24

## 2022-08-14 RX ADMIN — ACETAMINOPHEN PRN MG: 325 TABLET ORAL at 18:12

## 2022-08-14 RX ADMIN — THIAMINE HYDROCHLORIDE SCH MG: 100 INJECTION, SOLUTION INTRAMUSCULAR; INTRAVENOUS at 09:24

## 2022-08-15 LAB
ALBUMIN SERPL-MCNC: 2.2 G/DL (ref 3.4–5)
ALP SERPL-CCNC: 313 U/L (ref 45–117)
ALT SERPL-CCNC: 55 U/L (ref 13–61)
ANION GAP SERPL CALC-SCNC: 10 MMOL/L (ref 8–16)
AST SERPL-CCNC: 63 U/L (ref 15–37)
BILIRUB SERPL-MCNC: 0.7 MG/DL (ref 0.2–1)
BUN SERPL-MCNC: 10.6 MG/DL (ref 7–18)
CALCIUM SERPL-MCNC: 7.7 MG/DL (ref 8.5–10.1)
CHLORIDE SERPL-SCNC: 102 MMOL/L (ref 98–107)
CO2 SERPL-SCNC: 26 MMOL/L (ref 21–32)
CREAT SERPL-MCNC: 0.7 MG/DL (ref 0.55–1.3)
DEPRECATED RDW RBC AUTO: 13.2 % (ref 11.9–15.9)
GLUCOSE SERPL-MCNC: 158 MG/DL (ref 74–106)
HCT VFR BLD CALC: 31.2 % (ref 35.4–49)
HGB BLD-MCNC: 10.6 GM/DL (ref 11.7–16.9)
MCH RBC QN AUTO: 31 PG (ref 25.7–33.7)
MCHC RBC AUTO-ENTMCNC: 34.1 G/DL (ref 32–35.9)
MCV RBC: 90.7 FL (ref 80–96)
PLATELET # BLD AUTO: 220 10^3/UL (ref 134–434)
PMV BLD: 7.2 FL (ref 7.5–11.1)
PROT SERPL-MCNC: 5.7 G/DL (ref 6.4–8.2)
RBC # BLD AUTO: 3.44 M/MM3 (ref 4–5.6)
SODIUM SERPL-SCNC: 138 MMOL/L (ref 136–145)
WBC # BLD AUTO: 12.2 K/MM3 (ref 4–10)

## 2022-08-15 RX ADMIN — PANTOPRAZOLE SODIUM SCH MG: 40 INJECTION, POWDER, FOR SOLUTION INTRAVENOUS at 09:38

## 2022-08-15 RX ADMIN — ATORVASTATIN CALCIUM SCH MG: 80 TABLET, FILM COATED ORAL at 21:40

## 2022-08-15 RX ADMIN — ACETAMINOPHEN PRN MG: 325 TABLET ORAL at 16:53

## 2022-08-15 RX ADMIN — ENOXAPARIN SODIUM SCH MG: 100 INJECTION SUBCUTANEOUS at 21:40

## 2022-08-15 RX ADMIN — ACETAMINOPHEN PRN MG: 325 TABLET ORAL at 03:42

## 2022-08-15 RX ADMIN — FERROUS SULFATE TAB EC 324 MG (65 MG FE EQUIVALENT) SCH MG: 324 (65 FE) TABLET DELAYED RESPONSE at 09:37

## 2022-08-15 RX ADMIN — THIAMINE HYDROCHLORIDE SCH MG: 100 INJECTION, SOLUTION INTRAMUSCULAR; INTRAVENOUS at 09:38

## 2022-08-15 RX ADMIN — AMLODIPINE BESYLATE SCH MG: 5 TABLET ORAL at 09:37

## 2022-08-15 RX ADMIN — ENOXAPARIN SODIUM SCH MG: 100 INJECTION SUBCUTANEOUS at 09:37

## 2022-08-15 RX ADMIN — FOLIC ACID SCH MG: 1 TABLET ORAL at 09:37

## 2022-08-16 LAB
ALBUMIN SERPL-MCNC: 2.2 G/DL (ref 3.4–5)
ALP SERPL-CCNC: 320 U/L (ref 45–117)
ALT SERPL-CCNC: 53 U/L (ref 13–61)
ANION GAP SERPL CALC-SCNC: 9 MMOL/L (ref 8–16)
AST SERPL-CCNC: 64 U/L (ref 15–37)
BILIRUB SERPL-MCNC: 0.9 MG/DL (ref 0.2–1)
BUN SERPL-MCNC: 9.7 MG/DL (ref 7–18)
CALCIUM SERPL-MCNC: 8.4 MG/DL (ref 8.5–10.1)
CHLORIDE SERPL-SCNC: 101 MMOL/L (ref 98–107)
CO2 SERPL-SCNC: 26 MMOL/L (ref 21–32)
CREAT SERPL-MCNC: 0.7 MG/DL (ref 0.55–1.3)
DEPRECATED RDW RBC AUTO: 13.6 % (ref 11.9–15.9)
GLUCOSE SERPL-MCNC: 128 MG/DL (ref 74–106)
HCT VFR BLD CALC: 31.2 % (ref 35.4–49)
HGB BLD-MCNC: 10.7 GM/DL (ref 11.7–16.9)
MAGNESIUM SERPL-MCNC: 1.6 MG/DL (ref 1.8–2.4)
MCH RBC QN AUTO: 30.9 PG (ref 25.7–33.7)
MCHC RBC AUTO-ENTMCNC: 34.3 G/DL (ref 32–35.9)
MCV RBC: 90 FL (ref 80–96)
PLATELET # BLD AUTO: 217 10^3/UL (ref 134–434)
PMV BLD: 7.4 FL (ref 7.5–11.1)
PROT SERPL-MCNC: 5.8 G/DL (ref 6.4–8.2)
RBC # BLD AUTO: 3.47 M/MM3 (ref 4–5.6)
SODIUM SERPL-SCNC: 136 MMOL/L (ref 136–145)
WBC # BLD AUTO: 12 K/MM3 (ref 4–10)

## 2022-08-16 RX ADMIN — ENOXAPARIN SODIUM SCH MG: 100 INJECTION SUBCUTANEOUS at 10:09

## 2022-08-16 RX ADMIN — ATORVASTATIN CALCIUM SCH MG: 80 TABLET, FILM COATED ORAL at 22:23

## 2022-08-16 RX ADMIN — PIPERACILLIN SODIUM,TAZOBACTAM SODIUM SCH MLS/HR: 3; .375 INJECTION, POWDER, FOR SOLUTION INTRAVENOUS at 12:33

## 2022-08-16 RX ADMIN — AMLODIPINE BESYLATE SCH MG: 5 TABLET ORAL at 10:10

## 2022-08-16 RX ADMIN — PANTOPRAZOLE SODIUM SCH MG: 40 INJECTION, POWDER, FOR SOLUTION INTRAVENOUS at 10:08

## 2022-08-16 RX ADMIN — Medication SCH EACH: at 22:25

## 2022-08-16 RX ADMIN — FERROUS SULFATE TAB EC 324 MG (65 MG FE EQUIVALENT) SCH MG: 324 (65 FE) TABLET DELAYED RESPONSE at 10:10

## 2022-08-16 RX ADMIN — PIPERACILLIN SODIUM,TAZOBACTAM SODIUM SCH MLS/HR: 3; .375 INJECTION, POWDER, FOR SOLUTION INTRAVENOUS at 19:33

## 2022-08-16 RX ADMIN — ENOXAPARIN SODIUM SCH MG: 100 INJECTION SUBCUTANEOUS at 22:23

## 2022-08-16 RX ADMIN — THIAMINE HYDROCHLORIDE SCH MG: 100 INJECTION, SOLUTION INTRAMUSCULAR; INTRAVENOUS at 10:09

## 2022-08-16 RX ADMIN — FOLIC ACID SCH MG: 1 TABLET ORAL at 10:10

## 2022-08-17 VITALS — RESPIRATION RATE: 18 BRPM

## 2022-08-17 LAB
ALBUMIN SERPL-MCNC: 2.2 G/DL (ref 3.4–5)
ALP SERPL-CCNC: 340 U/L (ref 45–117)
ALT SERPL-CCNC: 54 U/L (ref 13–61)
ANION GAP SERPL CALC-SCNC: 10 MMOL/L (ref 8–16)
AST SERPL-CCNC: 66 U/L (ref 15–37)
BILIRUB SERPL-MCNC: 0.9 MG/DL (ref 0.2–1)
BUN SERPL-MCNC: 9.2 MG/DL (ref 7–18)
CALCIUM SERPL-MCNC: 8 MG/DL (ref 8.5–10.1)
CHLORIDE SERPL-SCNC: 102 MMOL/L (ref 98–107)
CO2 SERPL-SCNC: 25 MMOL/L (ref 21–32)
CREAT SERPL-MCNC: 0.8 MG/DL (ref 0.55–1.3)
DEPRECATED RDW RBC AUTO: 13.5 % (ref 11.9–15.9)
GLUCOSE SERPL-MCNC: 131 MG/DL (ref 74–106)
HCT VFR BLD CALC: 30.4 % (ref 35.4–49)
HGB BLD-MCNC: 10.6 GM/DL (ref 11.7–16.9)
MAGNESIUM SERPL-MCNC: 1.8 MG/DL (ref 1.8–2.4)
MCH RBC QN AUTO: 31.3 PG (ref 25.7–33.7)
MCHC RBC AUTO-ENTMCNC: 34.8 G/DL (ref 32–35.9)
MCV RBC: 89.9 FL (ref 80–96)
PLATELET # BLD AUTO: 228 10^3/UL (ref 134–434)
PMV BLD: 7.5 FL (ref 7.5–11.1)
PROT SERPL-MCNC: 5.8 G/DL (ref 6.4–8.2)
RBC # BLD AUTO: 3.38 M/MM3 (ref 4–5.6)
SODIUM SERPL-SCNC: 136 MMOL/L (ref 136–145)
WBC # BLD AUTO: 11.4 K/MM3 (ref 4–10)

## 2022-08-17 RX ADMIN — PIPERACILLIN SODIUM,TAZOBACTAM SODIUM SCH MLS/HR: 3; .375 INJECTION, POWDER, FOR SOLUTION INTRAVENOUS at 02:10

## 2022-08-17 RX ADMIN — FERROUS SULFATE TAB EC 324 MG (65 MG FE EQUIVALENT) SCH MG: 324 (65 FE) TABLET DELAYED RESPONSE at 09:43

## 2022-08-17 RX ADMIN — ENOXAPARIN SODIUM SCH MG: 100 INJECTION SUBCUTANEOUS at 09:43

## 2022-08-17 RX ADMIN — ENOXAPARIN SODIUM SCH MG: 100 INJECTION SUBCUTANEOUS at 21:10

## 2022-08-17 RX ADMIN — FOLIC ACID SCH MG: 1 TABLET ORAL at 09:50

## 2022-08-17 RX ADMIN — PANTOPRAZOLE SODIUM SCH MG: 40 INJECTION, POWDER, FOR SOLUTION INTRAVENOUS at 09:44

## 2022-08-17 RX ADMIN — PIPERACILLIN SODIUM,TAZOBACTAM SODIUM SCH MLS/HR: 3; .375 INJECTION, POWDER, FOR SOLUTION INTRAVENOUS at 17:30

## 2022-08-17 RX ADMIN — AMLODIPINE BESYLATE SCH MG: 5 TABLET ORAL at 09:43

## 2022-08-17 RX ADMIN — ATORVASTATIN CALCIUM SCH MG: 80 TABLET, FILM COATED ORAL at 21:10

## 2022-08-17 RX ADMIN — PIPERACILLIN SODIUM,TAZOBACTAM SODIUM SCH MLS/HR: 3; .375 INJECTION, POWDER, FOR SOLUTION INTRAVENOUS at 09:43

## 2022-08-17 RX ADMIN — Medication SCH EACH: at 21:13

## 2022-08-17 RX ADMIN — THIAMINE HYDROCHLORIDE SCH MG: 100 INJECTION, SOLUTION INTRAMUSCULAR; INTRAVENOUS at 10:29

## 2022-08-18 VITALS — HEART RATE: 63 BPM

## 2022-08-18 VITALS — SYSTOLIC BLOOD PRESSURE: 124 MMHG | DIASTOLIC BLOOD PRESSURE: 64 MMHG | TEMPERATURE: 99 F

## 2022-08-18 LAB
ALBUMIN SERPL-MCNC: 2.2 G/DL (ref 3.4–5)
ALP SERPL-CCNC: 356 U/L (ref 45–117)
ALT SERPL-CCNC: 55 U/L (ref 13–61)
ANION GAP SERPL CALC-SCNC: 8 MMOL/L (ref 8–16)
AST SERPL-CCNC: 62 U/L (ref 15–37)
BILIRUB SERPL-MCNC: 0.8 MG/DL (ref 0.2–1)
BUN SERPL-MCNC: 10.9 MG/DL (ref 7–18)
CALCIUM SERPL-MCNC: 8.2 MG/DL (ref 8.5–10.1)
CHLORIDE SERPL-SCNC: 102 MMOL/L (ref 98–107)
CO2 SERPL-SCNC: 27 MMOL/L (ref 21–32)
CREAT SERPL-MCNC: 0.7 MG/DL (ref 0.55–1.3)
DEPRECATED RDW RBC AUTO: 13.5 % (ref 11.9–15.9)
GLUCOSE SERPL-MCNC: 128 MG/DL (ref 74–106)
HCT VFR BLD CALC: 31.3 % (ref 35.4–49)
HGB BLD-MCNC: 10.6 GM/DL (ref 11.7–16.9)
MCH RBC QN AUTO: 30.8 PG (ref 25.7–33.7)
MCHC RBC AUTO-ENTMCNC: 34 G/DL (ref 32–35.9)
MCV RBC: 90.7 FL (ref 80–96)
PLATELET # BLD AUTO: 217 10^3/UL (ref 134–434)
PMV BLD: 7.7 FL (ref 7.5–11.1)
PROT SERPL-MCNC: 5.7 G/DL (ref 6.4–8.2)
RBC # BLD AUTO: 3.45 M/MM3 (ref 4–5.6)
SODIUM SERPL-SCNC: 137 MMOL/L (ref 136–145)
WBC # BLD AUTO: 11.9 K/MM3 (ref 4–10)

## 2022-08-18 RX ADMIN — PIPERACILLIN SODIUM,TAZOBACTAM SODIUM SCH MLS/HR: 3; .375 INJECTION, POWDER, FOR SOLUTION INTRAVENOUS at 01:07

## 2022-08-18 RX ADMIN — FOLIC ACID SCH MG: 1 TABLET ORAL at 09:33

## 2022-08-18 RX ADMIN — THIAMINE HYDROCHLORIDE SCH MG: 100 INJECTION, SOLUTION INTRAMUSCULAR; INTRAVENOUS at 10:14

## 2022-08-18 RX ADMIN — FERROUS SULFATE TAB EC 324 MG (65 MG FE EQUIVALENT) SCH MG: 324 (65 FE) TABLET DELAYED RESPONSE at 09:33

## 2022-08-18 RX ADMIN — PIPERACILLIN SODIUM,TAZOBACTAM SODIUM SCH MLS/HR: 3; .375 INJECTION, POWDER, FOR SOLUTION INTRAVENOUS at 09:34

## 2022-08-18 RX ADMIN — AMLODIPINE BESYLATE SCH MG: 5 TABLET ORAL at 09:33

## 2022-08-18 RX ADMIN — ENOXAPARIN SODIUM SCH MG: 100 INJECTION SUBCUTANEOUS at 09:33

## 2022-08-18 RX ADMIN — PANTOPRAZOLE SODIUM SCH MG: 40 INJECTION, POWDER, FOR SOLUTION INTRAVENOUS at 09:34

## 2022-08-28 ENCOUNTER — HOSPITAL ENCOUNTER (INPATIENT)
Dept: HOSPITAL 74 - JER | Age: 67
LOS: 1 days | DRG: 61 | End: 2022-08-29
Attending: INTERNAL MEDICINE | Admitting: INTERNAL MEDICINE
Payer: COMMERCIAL

## 2022-08-28 VITALS — BODY MASS INDEX: 26.2 KG/M2

## 2022-08-28 DIAGNOSIS — R11.10: ICD-10-CM

## 2022-08-28 DIAGNOSIS — I10: ICD-10-CM

## 2022-08-28 DIAGNOSIS — Z79.01: ICD-10-CM

## 2022-08-28 DIAGNOSIS — H53.461: ICD-10-CM

## 2022-08-28 DIAGNOSIS — C25.2: ICD-10-CM

## 2022-08-28 DIAGNOSIS — R29.810: ICD-10-CM

## 2022-08-28 DIAGNOSIS — Z86.711: ICD-10-CM

## 2022-08-28 DIAGNOSIS — I25.2: ICD-10-CM

## 2022-08-28 DIAGNOSIS — I63.512: Primary | ICD-10-CM

## 2022-08-28 DIAGNOSIS — C78.7: ICD-10-CM

## 2022-08-28 DIAGNOSIS — E78.5: ICD-10-CM

## 2022-08-28 DIAGNOSIS — I25.10: ICD-10-CM

## 2022-08-28 DIAGNOSIS — I61.9: ICD-10-CM

## 2022-08-28 DIAGNOSIS — R47.01: ICD-10-CM

## 2022-08-28 DIAGNOSIS — G93.6: ICD-10-CM

## 2022-08-28 DIAGNOSIS — Z86.718: ICD-10-CM

## 2022-08-28 DIAGNOSIS — G81.91: ICD-10-CM

## 2022-08-28 LAB
ALBUMIN SERPL-MCNC: 2.4 G/DL (ref 3.4–5)
ALBUMIN SERPL-MCNC: 2.6 G/DL (ref 3.4–5)
ALP SERPL-CCNC: 411 U/L (ref 45–117)
ALP SERPL-CCNC: 446 U/L (ref 45–117)
ALT SERPL-CCNC: 79 U/L (ref 13–61)
ALT SERPL-CCNC: 99 U/L (ref 13–61)
ANION GAP SERPL CALC-SCNC: 11 MMOL/L (ref 8–16)
ANION GAP SERPL CALC-SCNC: 14 MMOL/L (ref 8–16)
APPEARANCE UR: CLEAR
APTT BLD: 27.5 SECONDS (ref 25.2–36.5)
APTT BLD: 33.6 SECONDS (ref 25.2–36.5)
AST SERPL-CCNC: 152 U/L (ref 15–37)
AST SERPL-CCNC: 90 U/L (ref 15–37)
BACTERIA # UR AUTO: 1 /UL (ref 0–1359)
BASOPHILS # BLD: 0.9 % (ref 0–2)
BILIRUB SERPL-MCNC: 0.8 MG/DL (ref 0.2–1)
BILIRUB SERPL-MCNC: 1.2 MG/DL (ref 0.2–1)
BILIRUB UR STRIP.AUTO-MCNC: (no result) MG/DL
BUN SERPL-MCNC: 17.2 MG/DL (ref 7–18)
BUN SERPL-MCNC: 19.5 MG/DL (ref 7–18)
CALCIUM SERPL-MCNC: 8.2 MG/DL (ref 8.5–10.1)
CALCIUM SERPL-MCNC: 8.9 MG/DL (ref 8.5–10.1)
CASTS URNS QL MICRO: 0 /UL (ref 0–3.1)
CHLORIDE SERPL-SCNC: 101 MMOL/L (ref 98–107)
CHLORIDE SERPL-SCNC: 106 MMOL/L (ref 98–107)
CHOLEST SERPL-MCNC: 126 MG/DL (ref 50–200)
CO2 SERPL-SCNC: 20 MMOL/L (ref 21–32)
CO2 SERPL-SCNC: 21 MMOL/L (ref 21–32)
COLOR UR: (no result)
CREAT SERPL-MCNC: 0.8 MG/DL (ref 0.55–1.3)
CREAT SERPL-MCNC: 1.1 MG/DL (ref 0.55–1.3)
DEPRECATED RDW RBC AUTO: 14.2 % (ref 11.9–15.9)
DEPRECATED RDW RBC AUTO: 14.5 % (ref 11.9–15.9)
EOSINOPHIL # BLD: 1.8 % (ref 0–4.5)
EPITH CASTS URNS QL MICRO: 3 /UL (ref 0–25.1)
GLUCOSE SERPL-MCNC: 115 MG/DL (ref 74–106)
GLUCOSE SERPL-MCNC: 171 MG/DL (ref 74–106)
HCT VFR BLD CALC: 33.4 % (ref 35.4–49)
HCT VFR BLD CALC: 35 % (ref 35.4–49)
HDLC SERPL-MCNC: 33 MG/DL (ref 40–60)
HGB BLD-MCNC: 11.2 GM/DL (ref 11.7–16.9)
HGB BLD-MCNC: 11.3 GM/DL (ref 11.7–16.9)
INR BLD: 1.38 (ref 0.83–1.09)
INR BLD: 1.99 (ref 0.83–1.09)
KETONES UR QL STRIP: (no result)
LDLC SERPL CALC-MCNC: 78 MG/DL (ref 5–100)
LEUKOCYTE ESTERASE UR QL STRIP.AUTO: (no result)
LYMPHOCYTES # BLD: 10.4 % (ref 8–40)
MCH RBC QN AUTO: 29.4 PG (ref 25.7–33.7)
MCH RBC QN AUTO: 30.4 PG (ref 25.7–33.7)
MCHC RBC AUTO-ENTMCNC: 32.2 G/DL (ref 32–35.9)
MCHC RBC AUTO-ENTMCNC: 33.7 G/DL (ref 32–35.9)
MCV RBC: 90.3 FL (ref 80–96)
MCV RBC: 91.4 FL (ref 80–96)
MONOCYTES # BLD AUTO: 7.5 % (ref 3.8–10.2)
NEUTROPHILS # BLD: 79.4 % (ref 42.8–82.8)
NITRITE UR QL STRIP: NEGATIVE
PH UR: 5.5 [PH] (ref 5–8)
PLATELET # BLD AUTO: 162 10^3/UL (ref 134–434)
PLATELET # BLD AUTO: 197 10^3/UL (ref 134–434)
PMV BLD: 7.1 FL (ref 7.5–11.1)
PMV BLD: 8 FL (ref 7.5–11.1)
PROT SERPL-MCNC: 6 G/DL (ref 6.4–8.2)
PROT SERPL-MCNC: 6.7 G/DL (ref 6.4–8.2)
PROT UR QL STRIP: (no result)
PROT UR QL STRIP: NEGATIVE
PT PNL PPP: 15.9 SEC (ref 9.7–13)
PT PNL PPP: 23.1 SEC (ref 9.7–13)
RBC # BLD AUTO: 20 /UL (ref 0–23.9)
RBC # BLD AUTO: 3.69 M/MM3 (ref 4–5.6)
RBC # BLD AUTO: 3.83 M/MM3 (ref 4–5.6)
SODIUM SERPL-SCNC: 135 MMOL/L (ref 136–145)
SODIUM SERPL-SCNC: 138 MMOL/L (ref 136–145)
SP GR UR: 1.02 (ref 1.01–1.03)
TRIGL SERPL-MCNC: 97 MG/DL (ref 0–150)
UROBILINOGEN UR STRIP-MCNC: 1 MG/DL (ref 0.2–1)
WBC # BLD AUTO: 14.4 K/MM3 (ref 4–10)
WBC # BLD AUTO: 21.8 K/MM3 (ref 4–10)
WBC # UR AUTO: 8 /UL (ref 0–25.8)

## 2022-08-28 PROCEDURE — U0003 INFECTIOUS AGENT DETECTION BY NUCLEIC ACID (DNA OR RNA); SEVERE ACUTE RESPIRATORY SYNDROME CORONAVIRUS 2 (SARS-COV-2) (CORONAVIRUS DISEASE [COVID-19]), AMPLIFIED PROBE TECHNIQUE, MAKING USE OF HIGH THROUGHPUT TECHNOLOGIES AS DESCRIBED BY CMS-2020-01-R: HCPCS

## 2022-08-28 PROCEDURE — U0005 INFEC AGEN DETEC AMPLI PROBE: HCPCS

## 2022-08-29 VITALS — DIASTOLIC BLOOD PRESSURE: 62 MMHG | SYSTOLIC BLOOD PRESSURE: 76 MMHG

## 2022-08-29 VITALS — TEMPERATURE: 97.6 F
